# Patient Record
Sex: FEMALE | Race: WHITE | URBAN - METROPOLITAN AREA
[De-identification: names, ages, dates, MRNs, and addresses within clinical notes are randomized per-mention and may not be internally consistent; named-entity substitution may affect disease eponyms.]

---

## 2017-01-01 ENCOUNTER — MEDICAL CORRESPONDENCE (OUTPATIENT)
Dept: HEALTH INFORMATION MANAGEMENT | Facility: CLINIC | Age: 82
End: 2017-01-01

## 2017-01-01 ENCOUNTER — TELEPHONE (OUTPATIENT)
Dept: FAMILY MEDICINE | Facility: CLINIC | Age: 82
End: 2017-01-01

## 2017-01-01 ENCOUNTER — OFFICE VISIT (OUTPATIENT)
Dept: ORTHOPEDICS | Facility: CLINIC | Age: 82
End: 2017-01-01
Payer: COMMERCIAL

## 2017-01-01 ENCOUNTER — TELEPHONE (OUTPATIENT)
Dept: CARE COORDINATION | Facility: CLINIC | Age: 82
End: 2017-01-01

## 2017-01-01 ENCOUNTER — CARE COORDINATION (OUTPATIENT)
Dept: CARE COORDINATION | Facility: CLINIC | Age: 82
End: 2017-01-01

## 2017-01-01 ENCOUNTER — OFFICE VISIT (OUTPATIENT)
Dept: CARDIOLOGY | Facility: CLINIC | Age: 82
End: 2017-01-01
Payer: COMMERCIAL

## 2017-01-01 ENCOUNTER — TELEPHONE (OUTPATIENT)
Dept: ORTHOPEDICS | Facility: CLINIC | Age: 82
End: 2017-01-01

## 2017-01-01 ENCOUNTER — OFFICE VISIT (OUTPATIENT)
Dept: FAMILY MEDICINE | Facility: CLINIC | Age: 82
End: 2017-01-01
Payer: COMMERCIAL

## 2017-01-01 ENCOUNTER — RADIANT APPOINTMENT (OUTPATIENT)
Dept: GENERAL RADIOLOGY | Facility: CLINIC | Age: 82
End: 2017-01-01
Attending: ORTHOPAEDIC SURGERY
Payer: COMMERCIAL

## 2017-01-01 ENCOUNTER — TRANSFERRED RECORDS (OUTPATIENT)
Dept: HEALTH INFORMATION MANAGEMENT | Facility: CLINIC | Age: 82
End: 2017-01-01

## 2017-01-01 ENCOUNTER — RADIANT APPOINTMENT (OUTPATIENT)
Dept: CARDIOLOGY | Facility: CLINIC | Age: 82
End: 2017-01-01
Attending: INTERNAL MEDICINE
Payer: COMMERCIAL

## 2017-01-01 ENCOUNTER — PRE VISIT (OUTPATIENT)
Dept: CARDIOLOGY | Facility: CLINIC | Age: 82
End: 2017-01-01

## 2017-01-01 ENCOUNTER — DOCUMENTATION ONLY (OUTPATIENT)
Dept: CARDIOLOGY | Facility: CLINIC | Age: 82
End: 2017-01-01

## 2017-01-01 VITALS — SYSTOLIC BLOOD PRESSURE: 158 MMHG | DIASTOLIC BLOOD PRESSURE: 83 MMHG | OXYGEN SATURATION: 96 % | HEART RATE: 78 BPM

## 2017-01-01 VITALS
HEART RATE: 96 BPM | WEIGHT: 104 LBS | DIASTOLIC BLOOD PRESSURE: 78 MMHG | HEIGHT: 65 IN | BODY MASS INDEX: 17.33 KG/M2 | OXYGEN SATURATION: 97 % | SYSTOLIC BLOOD PRESSURE: 128 MMHG | TEMPERATURE: 98.2 F

## 2017-01-01 VITALS
RESPIRATION RATE: 18 BRPM | OXYGEN SATURATION: 90 % | SYSTOLIC BLOOD PRESSURE: 149 MMHG | HEART RATE: 102 BPM | DIASTOLIC BLOOD PRESSURE: 88 MMHG

## 2017-01-01 VITALS
DIASTOLIC BLOOD PRESSURE: 80 MMHG | SYSTOLIC BLOOD PRESSURE: 134 MMHG | WEIGHT: 108 LBS | HEIGHT: 65 IN | OXYGEN SATURATION: 97 % | BODY MASS INDEX: 17.99 KG/M2 | TEMPERATURE: 97.4 F | HEART RATE: 80 BPM

## 2017-01-01 VITALS
SYSTOLIC BLOOD PRESSURE: 131 MMHG | WEIGHT: 88.2 LBS | DIASTOLIC BLOOD PRESSURE: 81 MMHG | HEIGHT: 65 IN | OXYGEN SATURATION: 90 % | HEART RATE: 90 BPM | BODY MASS INDEX: 14.7 KG/M2

## 2017-01-01 VITALS
WEIGHT: 102.2 LBS | BODY MASS INDEX: 17.03 KG/M2 | HEART RATE: 79 BPM | HEIGHT: 65 IN | TEMPERATURE: 97.6 F | OXYGEN SATURATION: 95 % | DIASTOLIC BLOOD PRESSURE: 80 MMHG | SYSTOLIC BLOOD PRESSURE: 130 MMHG

## 2017-01-01 VITALS
HEIGHT: 65 IN | WEIGHT: 96 LBS | OXYGEN SATURATION: 98 % | HEART RATE: 78 BPM | DIASTOLIC BLOOD PRESSURE: 64 MMHG | SYSTOLIC BLOOD PRESSURE: 122 MMHG | TEMPERATURE: 97 F | BODY MASS INDEX: 15.99 KG/M2

## 2017-01-01 VITALS
WEIGHT: 95 LBS | HEART RATE: 75 BPM | BODY MASS INDEX: 15.83 KG/M2 | SYSTOLIC BLOOD PRESSURE: 104 MMHG | DIASTOLIC BLOOD PRESSURE: 67 MMHG | RESPIRATION RATE: 14 BRPM | HEIGHT: 65 IN

## 2017-01-01 VITALS
OXYGEN SATURATION: 96 % | RESPIRATION RATE: 16 BRPM | HEART RATE: 102 BPM | SYSTOLIC BLOOD PRESSURE: 133 MMHG | DIASTOLIC BLOOD PRESSURE: 75 MMHG

## 2017-01-01 VITALS
HEART RATE: 88 BPM | RESPIRATION RATE: 16 BRPM | BODY MASS INDEX: 16.24 KG/M2 | WEIGHT: 97.6 LBS | DIASTOLIC BLOOD PRESSURE: 60 MMHG | SYSTOLIC BLOOD PRESSURE: 120 MMHG

## 2017-01-01 DIAGNOSIS — Z96.649 PERIPROSTHETIC FRACTURE OF SHAFT OF FEMUR: ICD-10-CM

## 2017-01-01 DIAGNOSIS — R79.89 ELEVATED TSH: Primary | ICD-10-CM

## 2017-01-01 DIAGNOSIS — M97.8XXA PERIPROSTHETIC FRACTURE OF SHAFT OF FEMUR: ICD-10-CM

## 2017-01-01 DIAGNOSIS — I49.5 SICK SINUS SYNDROME (H): Primary | ICD-10-CM

## 2017-01-01 DIAGNOSIS — I10 BENIGN ESSENTIAL HYPERTENSION: ICD-10-CM

## 2017-01-01 DIAGNOSIS — E78.5 HYPERLIPIDEMIA LDL GOAL <130: ICD-10-CM

## 2017-01-01 DIAGNOSIS — M19.91 PRIMARY LOCALIZED OSTEOARTHROSIS: Primary | ICD-10-CM

## 2017-01-01 DIAGNOSIS — M97.12XD PERIPROSTHETIC FRACTURE AROUND INTERNAL PROSTHETIC LEFT KNEE JOINT, SUBSEQUENT ENCOUNTER: Primary | ICD-10-CM

## 2017-01-01 DIAGNOSIS — I63.9 CEREBROVASCULAR ACCIDENT (CVA), UNSPECIFIED MECHANISM (H): Primary | ICD-10-CM

## 2017-01-01 DIAGNOSIS — S72.342D CLOSED DISPLACED SPIRAL FRACTURE OF SHAFT OF LEFT FEMUR WITH ROUTINE HEALING, SUBSEQUENT ENCOUNTER: ICD-10-CM

## 2017-01-01 DIAGNOSIS — Z95.0 CARDIAC PACEMAKER IN SITU: ICD-10-CM

## 2017-01-01 DIAGNOSIS — H91.93 HL (HEARING LOSS), BILATERAL: ICD-10-CM

## 2017-01-01 DIAGNOSIS — I10 BENIGN ESSENTIAL HYPERTENSION: Primary | ICD-10-CM

## 2017-01-01 DIAGNOSIS — I48.0 PAROXYSMAL ATRIAL FIBRILLATION (H): ICD-10-CM

## 2017-01-01 DIAGNOSIS — Z96.649 PERIPROSTHETIC FRACTURE OF SHAFT OF FEMUR: Primary | ICD-10-CM

## 2017-01-01 DIAGNOSIS — R63.4 LOSS OF WEIGHT: ICD-10-CM

## 2017-01-01 DIAGNOSIS — Z47.89 AFTERCARE FOLLOWING SURGERY OF THE MUSCULOSKELETAL SYSTEM: ICD-10-CM

## 2017-01-01 DIAGNOSIS — R29.898 WEAKNESS OF LEFT LOWER EXTREMITY: ICD-10-CM

## 2017-01-01 DIAGNOSIS — I49.5 SICK SINUS SYNDROME (H): ICD-10-CM

## 2017-01-01 DIAGNOSIS — D64.9 ANEMIA, UNSPECIFIED TYPE: ICD-10-CM

## 2017-01-01 DIAGNOSIS — S72.442S: Primary | ICD-10-CM

## 2017-01-01 DIAGNOSIS — M97.8XXA PERIPROSTHETIC FRACTURE OF SHAFT OF FEMUR: Primary | ICD-10-CM

## 2017-01-01 DIAGNOSIS — I48.0 PAROXYSMAL ATRIAL FIBRILLATION (H): Primary | ICD-10-CM

## 2017-01-01 DIAGNOSIS — Z47.89 SURGICAL AFTERCARE, MUSCULOSKELETAL SYSTEM: ICD-10-CM

## 2017-01-01 DIAGNOSIS — Z95.0 CARDIAC PACEMAKER IN SITU: Primary | ICD-10-CM

## 2017-01-01 DIAGNOSIS — M81.0 OSTEOPOROSIS: ICD-10-CM

## 2017-01-01 DIAGNOSIS — M97.02XA: ICD-10-CM

## 2017-01-01 DIAGNOSIS — K59.09 OTHER CONSTIPATION: ICD-10-CM

## 2017-01-01 DIAGNOSIS — S70.12XA THIGH HEMATOMA, LEFT, INITIAL ENCOUNTER: ICD-10-CM

## 2017-01-01 DIAGNOSIS — I48.91 ATRIAL FIBRILLATION, UNSPECIFIED TYPE (H): ICD-10-CM

## 2017-01-01 DIAGNOSIS — Z53.9 DIAGNOSIS NOT YET DEFINED: Primary | ICD-10-CM

## 2017-01-01 DIAGNOSIS — I63.9 CVA (CEREBRAL VASCULAR ACCIDENT) (H): ICD-10-CM

## 2017-01-01 LAB
ALBUMIN SERPL-MCNC: 2.6 G/DL (ref 3.4–5)
ALP SERPL-CCNC: 154 U/L (ref 40–150)
ALT SERPL W P-5'-P-CCNC: 43 U/L (ref 0–50)
ANION GAP SERPL CALCULATED.3IONS-SCNC: 8 MMOL/L (ref 3–14)
AST SERPL W P-5'-P-CCNC: 51 U/L (ref 0–45)
BASOPHILS # BLD AUTO: 0.1 10E9/L (ref 0–0.2)
BASOPHILS NFR BLD AUTO: 0.5 %
BILIRUB SERPL-MCNC: 0.8 MG/DL (ref 0.2–1.3)
BUN SERPL-MCNC: 9 MG/DL (ref 7–30)
CALCIUM SERPL-MCNC: 8.4 MG/DL (ref 8.5–10.1)
CHLORIDE SERPL-SCNC: 103 MMOL/L (ref 94–109)
CO2 SERPL-SCNC: 28 MMOL/L (ref 20–32)
CREAT SERPL-MCNC: 0.55 MG/DL (ref 0.52–1.04)
DIFFERENTIAL METHOD BLD: ABNORMAL
EOSINOPHIL # BLD AUTO: 0 10E9/L (ref 0–0.7)
EOSINOPHIL NFR BLD AUTO: 0.4 %
ERYTHROCYTE [DISTWIDTH] IN BLOOD BY AUTOMATED COUNT: 16.5 % (ref 10–15)
GFR SERPL CREATININE-BSD FRML MDRD: ABNORMAL ML/MIN/1.7M2
GLUCOSE SERPL-MCNC: 133 MG/DL (ref 70–99)
HCT VFR BLD AUTO: 37.6 % (ref 35–47)
HGB BLD-MCNC: 11.8 G/DL (ref 11.7–15.7)
LYMPHOCYTES # BLD AUTO: 0.4 10E9/L (ref 0.8–5.3)
LYMPHOCYTES NFR BLD AUTO: 3.7 %
MCH RBC QN AUTO: 31.1 PG (ref 26.5–33)
MCHC RBC AUTO-ENTMCNC: 31.4 G/DL (ref 31.5–36.5)
MCV RBC AUTO: 99 FL (ref 78–100)
MONOCYTES # BLD AUTO: 1.1 10E9/L (ref 0–1.3)
MONOCYTES NFR BLD AUTO: 10.3 %
NEUTROPHILS # BLD AUTO: 8.6 10E9/L (ref 1.6–8.3)
NEUTROPHILS NFR BLD AUTO: 85.1 %
PLATELET # BLD AUTO: 381 10E9/L (ref 150–450)
POTASSIUM SERPL-SCNC: 3.4 MMOL/L (ref 3.4–5.3)
PROT SERPL-MCNC: 5.8 G/DL (ref 6.8–8.8)
RBC # BLD AUTO: 3.79 10E12/L (ref 3.8–5.2)
SODIUM SERPL-SCNC: 139 MMOL/L (ref 133–144)
T4 FREE SERPL-MCNC: 0.99 NG/DL (ref 0.76–1.46)
TSH SERPL DL<=0.005 MIU/L-ACNC: 4.65 MU/L (ref 0.4–4)
WBC # BLD AUTO: 10.2 10E9/L (ref 4–11)

## 2017-01-01 PROCEDURE — 80053 COMPREHEN METABOLIC PANEL: CPT | Performed by: FAMILY MEDICINE

## 2017-01-01 PROCEDURE — 84439 ASSAY OF FREE THYROXINE: CPT | Performed by: FAMILY MEDICINE

## 2017-01-01 PROCEDURE — 99024 POSTOP FOLLOW-UP VISIT: CPT | Performed by: ORTHOPAEDIC SURGERY

## 2017-01-01 PROCEDURE — 73552 X-RAY EXAM OF FEMUR 2/>: CPT | Mod: LT

## 2017-01-01 PROCEDURE — 36415 COLL VENOUS BLD VENIPUNCTURE: CPT | Performed by: FAMILY MEDICINE

## 2017-01-01 PROCEDURE — 93280 PM DEVICE PROGR EVAL DUAL: CPT | Performed by: INTERNAL MEDICINE

## 2017-01-01 PROCEDURE — 85025 COMPLETE CBC W/AUTO DIFF WBC: CPT | Performed by: FAMILY MEDICINE

## 2017-01-01 PROCEDURE — 99213 OFFICE O/P EST LOW 20 MIN: CPT | Performed by: ORTHOPAEDIC SURGERY

## 2017-01-01 PROCEDURE — 99213 OFFICE O/P EST LOW 20 MIN: CPT | Performed by: FAMILY MEDICINE

## 2017-01-01 PROCEDURE — 99214 OFFICE O/P EST MOD 30 MIN: CPT | Performed by: FAMILY MEDICINE

## 2017-01-01 PROCEDURE — G0180 MD CERTIFICATION HHA PATIENT: HCPCS | Performed by: FAMILY MEDICINE

## 2017-01-01 PROCEDURE — 93306 TTE W/DOPPLER COMPLETE: CPT | Mod: GC | Performed by: INTERNAL MEDICINE

## 2017-01-01 PROCEDURE — 99204 OFFICE O/P NEW MOD 45 MIN: CPT | Performed by: INTERNAL MEDICINE

## 2017-01-01 PROCEDURE — 40000264 ZZHC STATISTIC IV PUSH SINGLE INITIAL SUBSTANCE: Mod: GC | Performed by: INTERNAL MEDICINE

## 2017-01-01 PROCEDURE — 84443 ASSAY THYROID STIM HORMONE: CPT | Performed by: FAMILY MEDICINE

## 2017-01-01 RX ORDER — ASPIRIN 81 MG/1
81 TABLET, CHEWABLE ORAL DAILY
COMMUNITY

## 2017-01-01 RX ORDER — AMIODARONE HYDROCHLORIDE 200 MG/1
200 TABLET ORAL DAILY
Qty: 90 TABLET | Refills: 1 | Status: SHIPPED | OUTPATIENT
Start: 2017-01-01

## 2017-01-01 RX ORDER — LISINOPRIL 5 MG/1
5 TABLET ORAL DAILY
Qty: 30 TABLET | Refills: 1 | COMMUNITY
Start: 2017-01-01 | End: 2017-01-01

## 2017-01-01 RX ORDER — LISINOPRIL 5 MG/1
5 TABLET ORAL DAILY
Qty: 90 TABLET | Refills: 1 | Status: SHIPPED | OUTPATIENT
Start: 2017-01-01 | End: 2017-01-01 | Stop reason: DRUGHIGH

## 2017-01-01 RX ORDER — DRONABINOL 2.5 MG/1
2.5 CAPSULE ORAL
COMMUNITY
Start: 2017-01-01 | End: 2017-01-01

## 2017-01-01 RX ORDER — ATENOLOL 25 MG/1
25 TABLET ORAL 2 TIMES DAILY
Qty: 180 TABLET | Refills: 4 | Status: SHIPPED | OUTPATIENT
Start: 2017-01-01 | End: 2017-01-01

## 2017-01-01 RX ORDER — TRAMADOL HYDROCHLORIDE 50 MG/1
50 TABLET ORAL
COMMUNITY
Start: 2017-01-01

## 2017-01-01 RX ORDER — LEVETIRACETAM 500 MG/1
500 TABLET ORAL 2 TIMES DAILY
Qty: 60 TABLET | Refills: 0 | COMMUNITY
Start: 2017-01-01 | End: 2017-01-01

## 2017-01-01 RX ORDER — ALENDRONATE SODIUM 70 MG/1
70 TABLET ORAL
Qty: 12 TABLET | Refills: 3 | Status: SHIPPED | OUTPATIENT
Start: 2017-01-01

## 2017-01-01 RX ORDER — AMIODARONE HYDROCHLORIDE 200 MG/1
200 TABLET ORAL DAILY
Qty: 30 TABLET | Refills: 1 | COMMUNITY
Start: 2017-01-01 | End: 2017-01-01

## 2017-01-01 RX ORDER — FERROUS SULFATE 325(65) MG
325 TABLET ORAL
COMMUNITY

## 2017-01-01 RX ORDER — SENNOSIDES 8.6 MG
1 TABLET ORAL DAILY
COMMUNITY

## 2017-01-01 RX ORDER — LISINOPRIL 10 MG/1
10 TABLET ORAL DAILY
Qty: 90 TABLET | Refills: 3 | Status: SHIPPED | OUTPATIENT
Start: 2017-01-01

## 2017-01-01 RX ORDER — LEVETIRACETAM 500 MG/1
500 TABLET ORAL 2 TIMES DAILY
Qty: 180 TABLET | Refills: 3 | Status: SHIPPED | OUTPATIENT
Start: 2017-01-01

## 2017-01-01 RX ORDER — SIMVASTATIN 20 MG
20 TABLET ORAL AT BEDTIME
Qty: 90 TABLET | Refills: 3 | Status: SHIPPED | OUTPATIENT
Start: 2017-01-01

## 2017-01-01 ASSESSMENT — PAIN SCALES - GENERAL
PAINLEVEL: NO PAIN (0)
PAINLEVEL: MILD PAIN (2)
PAINLEVEL: NO PAIN (0)
PAINLEVEL: SEVERE PAIN (6)

## 2017-01-09 NOTE — NURSING NOTE
"Chief Complaint   Patient presents with     RECHECK     Lab results       Initial /80 mmHg  Pulse 80  Temp(Src) 97.4  F (36.3  C)  Ht 5' 5\" (1.651 m)  Wt 108 lb (48.988 kg)  BMI 17.97 kg/m2  SpO2 97% Estimated body mass index is 17.97 kg/(m^2) as calculated from the following:    Height as of this encounter: 5' 5\" (1.651 m).    Weight as of this encounter: 108 lb (48.988 kg).  BP completed using cuff size: bahman Crystal MA      "

## 2017-01-09 NOTE — PATIENT INSTRUCTIONS
[facility]    If you have any questions regarding to your visit please contact your care team:       Team Purple:   Clinic Hours Telephone Number   [address]   7am-7pm  Monday - Thursday   7am-5pm  Fridays  [phone]  (Appointment scheduling available 24/7)    Questions about your Visit?   Team Line:  [phone]   Urgent Care - [place] and [facility] - 11am-9pm Monday-Friday Saturday-Sunday- 9am-5pm   [place] - 5pm-9pm Monday-Friday Saturday-Sunday- 9am-5pm  [phone] - [facility]  [phone] - [place]       What options do I have for visits at the clinic other than the traditional office visit?  To expand how we care for you, many of our providers are utilizing electronic visits (e-visits) and telephone visits, when medically appropriate, for interactions with their patients rather than a visit in the clinic.   We also offer nurse visits for many medical concerns. Just like any other service, we will bill your insurance company for this type of visit based on time spent on the phone with your provider. Not all insurance companies cover these visits. Please check with your medical insurance if this type of visit is covered. You will be responsible for any charges that are not paid by your insurance.      E-visits via [employer]:  generally incur a $35.00 fee.  Telephone visits:  Time spent on the phone: *charged based on time that is spent on the phone in increments of 10 minutes. Estimated cost:   5-10 mins $30.00   11-20 mins. $59.00   21-30 mins. $85.00     Use [employer] (secure email communication and access to your chart) to send your primary care provider a message or make an appointment. Ask someone on your Team how to sign up for [employer].  For a Price Quote for your services, please call our Consumer Price Line at [phone].  As always, Thank you for trusting us with your health care needs!

## 2017-01-09 NOTE — Clinical Note
[facility]  [address] [place] [address]    January 10, 2017    Navid Estrella  [address] [place] [id number]          Dear Navid,  Your thyroid tests are very close to normal now. No need to repeat them and no need for any medications. Continue your healthy lifestyle.  Enclosed is a copy of your results.     Results for orders placed or performed in visit on 01/09/17   TSH with free T4 reflex   Result Value Ref Range    TSH 4.65 (H) 0.40 - 4.00 mU/L   T4 free   Result Value Ref Range    T4 Free 0.99 0.76 - 1.46 ng/dL       If you have any questions or concerns, please call myself or my nurse at [phone].      Sincerely,        Bess Leiva MD/lore

## 2017-01-09 NOTE — MR AVS SNAPSHOT
After Visit Summary   1/9/2017    Navid Estrella    MRN: [id number]           Patient Information     Date Of Birth          5/14/1930        Visit Information        Provider Department      1/9/2017 11:30 AM Bess Leiva MD [facility]        Today's Diagnoses     Elevated TSH    -  1       Care Instructions    [facility]    If you have any questions regarding to your visit please contact your care team:       Team Purple:   Clinic Hours Telephone Number   [address] Dr[address]   7am-7pm  Monday - Thursday   7am-5pm  Fridays  [phone]  (Appointment scheduling available 24/7)    Questions about your Visit?   Team Line:  [phone]   Urgent Care - [place] and [facility] - 11am-9pm Monday-Friday Saturday-Sunday- 9am-5pm   [place] - 5pm-9pm Monday-Friday Saturday-Sunday- 9am-5pm  [phone] - [place]   [phone] - [place]       What options do I have for visits at the clinic other than the traditional office visit?  To expand how we care for you, many of our providers are utilizing electronic visits (e-visits) and telephone visits, when medically appropriate, for interactions with their patients rather than a visit in the clinic.   We also offer nurse visits for many medical concerns. Just like any other service, we will bill your insurance company for this type of visit based on time spent on the phone with your provider. Not all insurance companies cover these visits. Please check with your medical insurance if this type of visit is covered. You will be responsible for any charges that are not paid by your insurance.      E-visits via [employer]:  generally incur a $35.00 fee.  Telephone visits:  Time spent on the phone: *charged based on time that is spent on the phone in increments of 10 minutes. Estimated cost:   5-10 mins $30.00   11-20 mins. $59.00   21-30 mins. $85.00     Use  "MyChart (secure email communication and access to your chart) to send your primary care provider a message or make an appointment. Ask someone on your Team how to sign up for [employer].  For a Price Quote for your services, please call our [employer] Price Line at [phone].  As always, Thank you for trusting us with your health care needs!            Follow-ups after your visit        Who to contact     If you have questions or need follow up information about today's clinic visit or your schedule please contact [facility] directly at [phone].  Normal or non-critical lab and imaging results will be communicated to you by MyChart, letter or phone within 4 business days after the clinic has received the results. If you do not hear from us within 7 days, please contact the clinic through [employer] or phone. If you have a critical or abnormal lab result, we will notify you by phone as soon as possible.  Submit refill requests through [employer] or call your pharmacy and they will forward the refill request to us. Please allow 3 business days for your refill to be completed.          Additional Information About Your Visit        MyChart Information     [employer] gives you secure access to your electronic health record. If you see a primary care provider, you can also send messages to your care team and make appointments. If you have questions, please call your primary care clinic.  If you do not have a primary care provider, please call [phone] and they will assist you.        Care EveryWhere ID     This is your Care EveryWhere ID. This could be used by other organizations to access your [facility] medical records  [id number]        Your Vitals Were     Pulse Temperature Height BMI (Body Mass Index) Pulse Oximetry       80 97.4  F (36.3  C) 5' 5\" (1.651 m) 17.97 kg/m2 97%        Blood Pressure from Last 3 Encounters:   01/09/17 134/80   09/20/16 128/70   05/24/16 158/83    Weight from Last 3 Encounters: "   01/09/17 108 lb (48.988 kg)   09/20/16 107 lb 9.6 oz (48.807 kg)   05/24/16 109 lb (49.442 kg)              We Performed the Following     TSH with free T4 reflex        Primary Care Provider Office Phone # Fax #    Bess Leiva [phone]       [address] [address]        Thank you!     Thank you for choosing [facility]  for your care. Our goal is always to provide you with excellent care. Hearing back from our patients is one way we can continue to improve our services. Please take a few minutes to complete the written survey that you may receive in the mail after your visit with us. Thank you!             Your Updated Medication List - Protect others around you: Learn how to safely use, store and throw away your medicines at www.disposemymeds.org.          This list is accurate as of: 1/9/17 11:59 AM.  Always use your most recent med list.                   Brand Name Dispense Instructions for use    alendronate 70 MG tablet    FOSAMAX    12 tablet    Take 1 tablet (70 mg) by mouth every 7 days Take with over 8 ounces water and stay upright for at least 30 minutes after dose.  Take at least 60 minutes before breakfast       aspirin 81 MG tablet      Take 1 tablet by mouth daily.       atenolol 25 MG tablet    TENORMIN    180 tablet    TAKE ONE TABLET BY MOUTH TWICE A DAY       fluticasone 50 MCG/ACT spray    FLONASE    16 g    Spray 1-2 sprays into both nostrils daily       OXYCODONE HCL PO      Take 5 mg by mouth every 3 hours as needed       polyethylene glycol powder    MIRALAX    510 g    Take 17 g by mouth daily       simvastatin 40 MG tablet    ZOCOR    90 tablet    TAKE ONE TABLET BY MOUTH EVERY DAY. DUE FOR FASTING LABS.       TYLENOL PO      Take 500 mg by mouth every 4 hours as needed for mild pain or fever       vitamin D 1000 UNITS capsule      1 CAPSULE DAILY

## 2017-01-09 NOTE — PROGRESS NOTES
"Chief Complaint   Patient presents with     RECHECK     Lab results     SUBJECTIVE:  This 86 year old female is here today because her TSH was elevated to 6.62 but her T4 was normal. She is here to have it rechecked. She doesn't have sleeping or eating problems. She will fly to [place] next month to spend some time with her daughter and 2 grandchildren. She only has one child. She has a sister and nieces that live here in [place] that help her a lot. She has no new concerns. She is happy to tell me that she has gained 2 pounds. Doesn't know how she did it. All other review of systems are negative  Personal, family, and social history reviewed with patient and revised.  Current Outpatient Prescriptions   Medication     simvastatin (ZOCOR) 40 MG tablet     atenolol (TENORMIN) 25 MG tablet     alendronate (FOSAMAX) 70 MG tablet     Acetaminophen (TYLENOL PO)     polyethylene glycol (MIRALAX) powder     aspirin 81 MG tablet     VITAMIN D 1000 UNIT OR CAPS     OXYCODONE HCL PO     fluticasone (FLONASE) 50 MCG/ACT nasal spray     No current facility-administered medications for this visit.     OBJECTIVE:  Vital signs:  Temp: 97.4  F (36.3  C)   BP: 134/80 mmHg Pulse: 80     SpO2: 97 %     Height: 5' 5\" (165.1 cm) Weight: 108 lb (48.988 kg)  Estimated body mass index is 17.97 kg/(m^2) as calculated from the following:    Height as of this encounter: 5' 5\" (1.651 m).    Weight as of this encounter: 108 lb (48.988 kg).    She is very slim. I do not want to put her on synthroid unless I have to. She is not tachycardic.   Heart: normal rate and rhythm with no murmur  Lungs; clear in all fields  Anxious lady with rapid speech. This is normal for her  Well hydrated  Well nourished  Well groomed  Alert and oriented X 3  Good spirits  Brisk gait.  ASSESSMENT / PLAN:  (R94.6) Elevated TSH  (primary encounter diagnosis)  Comment: as above   Plan: TSH with free T4 reflex             TATE WILKS M.D.          "

## 2017-02-14 NOTE — TELEPHONE ENCOUNTER
Reason for Call:  Other FYI    Detailed comments: patient daughter calling and stating that patient went down to [place] to visit and had a stroke. Patient is currently in the ICU and she has lost her speech.    Phone Number Patient can be reached at: Work number on file: [phone]  Best Time: any time    Can we leave a detailed message on this number? YES    Call taken on 2/14/2017 at 2:43 PM by Maren Bruno

## 2017-02-20 NOTE — TELEPHONE ENCOUNTER
Reason for Call:  Other     Detailed comments: Kathya reports that patient is currently discharged from the hospital and she needs a call back to discuss new medications and care.    Phone Number Patient can be reached at: Cell number on file:    Telephone Information:(Cell)   [phone]        Best Time: anytime    Can we leave a detailed message on this number? YES    Call taken on 2/20/2017 at 4:22 PM by Amy Beltran

## 2017-02-21 NOTE — TELEPHONE ENCOUNTER
Left message on voicemail for Kathya to return call to RN hotline at # [phone].  Stephen Stanford RN

## 2017-02-24 NOTE — TELEPHONE ENCOUNTER
Voice mail left for patients daughter to call RN hotline at [phone].    Ladonna Gallegos RN

## 2017-03-28 PROBLEM — I63.9 CEREBROVASCULAR ACCIDENT (CVA), UNSPECIFIED MECHANISM (H): Status: ACTIVE | Noted: 2017-01-01

## 2017-03-28 NOTE — TELEPHONE ENCOUNTER
Spoke with Niece, gave information regarding Care coordinator referral.  She states daughter will want to call.  Gave daughter number [phone] to call Care Coordination.  Ladonna Gallegos RN

## 2017-03-28 NOTE — TELEPHONE ENCOUNTER
Call Maren, I will place order for care coordination. Maren or her daughter need to make sure patient has good support systems at this end before they go back to [place] as patient is very hard of hearing. She needs help to set up a life alert system, probably meals on wheels, etc.     TATE WILKS M.D.

## 2017-03-28 NOTE — TELEPHONE ENCOUNTER
Spoke with Maren (niece), patient was in [place] and had a stroke, patient is coming back Monday she is wondering if there is a  or Care coordinator that can help patient with any services or help she may need.   Please advise  Ladonna Gallegos RN

## 2017-03-28 NOTE — TELEPHONE ENCOUNTER
Reason for Call:  Other call back    Detailed comments: Maren would like a return call to discuss the status of the patient, and also to discuss the next steps as the patient is currently located in [place] and wanting to return back to [place],  Maren has concerns as the patient had a stroke in the past and also forgetful at times please call to discuss further    Phone Number Patient can be reached at: Other phone number:  [phone]    Best Time: today    Can we leave a detailed message on this number? YES    Call taken on 3/28/2017 at 10:40 AM by Corby Murphy

## 2017-03-29 NOTE — LETTER
[facility]   [address] [place] [address]  [place] [address]  [phone]    March 29, 2017    Navid Estrella  [address] [place] [address]  [place] [address]    Dear Navid,  I am the Clinic Care Coordinator that works with your primary care provider's clinic. I wanted to introduce myself and provide you with my contact information for you to be able to call me with any questions or concerns. Below is a description of what Clinic Care Coordination is and how I can further assist you.     The Clinic Care Coordinator role is a Registered Nurse and/or  who understands the health care system. The goal of Clinic Care Coordination is to help you manage your health and improve access to the [facility] in the most efficient manner.  The Registered Nurse can assist you in meeting your health care goals by providing education, coordinating services, and strengthening the communication among your providers. The  can assist you with financial, behavioral, psychosocial, and chemical dependency and counseling/psychiatric resources.    Please feel free to keep this letter and contact information to contact me at [phone] with any further questions or concerns that may arise. We at [facility] are focused on providing you with the highest-quality healthcare experience possible and that all starts with you.       Sincerely,     Nataly Murcia RN BSN, PHN RN Care Coordinator  [facility]  [email or web address]  [phone]  3/29/2017 3:12 PM  Enclosed: I have enclosed a copy of a 24 Hour Access Plan. This has helpful phone numbers for you to call when needed. Please keep this in an easy to access place to use as needed.

## 2017-03-29 NOTE — TELEPHONE ENCOUNTER
Reason for Call:  Other Referral needed for cardiology.    Detailed comments: Daughter is calling and would like a call back to discuss getting a referral to see the cardiologist for her mother.     Phone Number Patient can be reached at: Other phone number: [phone]     Best Time: anytime    Can we leave a detailed message on this number? YES    Call taken on 3/29/2017 at 1:33 PM by Sofia Ross

## 2017-03-29 NOTE — TELEPHONE ENCOUNTER
Voice mail left for patients daughter to call RN hotline at [phone].    Ladonna Gallegos RN

## 2017-03-29 NOTE — PROGRESS NOTES
Clinic Care Coordination Contact  [facility]/Voicemail    Referral Source: PCP    Clinical Data: Care Coordinator Outreach - Per provider note and Purple team RN note pt daughter called requesting assistance in arranging services for pt.  Pt currently in [place] visiting her daughter is unfortunately suffered a stroke.   Per notes pt will be returning to [place] on Monday 4/3/17.  Pt has an appt with Dr. Smith on 4/11/17.  VM left for daughter Isaac, [phone].      Outreach attempted x 1.  Left message on voicemail with call back information and requested return call.    Plan: Care Coordinator will mail out care coordination introduction letter with care coordinator contact information and explanation of care coordination services. Care Coordinator will try to reach patient again in 1-2 business days.    Nataly Murcia RN BSN, PHN RN Care Coordinator  [facility]  [email or web address]  [phone]  3/29/2017 3:09 PM

## 2017-03-29 NOTE — LETTER
My Access Plan    Presenting Problem Signs and Symptoms Treatment Plan    Questions or concerns during clinic hours    I will call the clinic directly:                     [facility]    [address]               [place] [address]                  [phone]       Questions or concerns outside clinic hours    I will call the 24 hour nurse line at [phone] or [phone]    Patient needs to schedule an appointment    I will call the 24 hour scheduling team at [phone] or clinic directly at [phone]    Same day treatment     I will call the clinic first, nurse line if after hours, urgent care and express care if needed   Clinic Care Coordinators (RN/Social Work):            [facility]      Nataly Murcia RN BSN N  [phone]    SIERRA Yeung W [phone]   Crisis Services: Behavioral or Mental Health    [facility] (Behavioral Health Providers) [phone]    [facility] [phone]    [facility] [phone]    Crisis Connection (24/7): [phone]       Emergency treatment--Immediately    CAll [address]

## 2017-03-29 NOTE — PROGRESS NOTES
Clinic Care Coordination Contact  OUTREACH    Referral Information:  Referral Source: PCP  Reason for Contact: new stroke/resources  Care Conference: No     Universal Utilization:   ED Visits in last year: 1  Hospital visits in last year: 1  Last PCP appointment: 09/20/16  Missed Appointments: 0     Clinical Concerns:  Current Medical Concerns: In January, pt was visiting her daughter in [place] when she suffered a stroke.  Pt has been living with her daughter but will be returning to [place] on Monday 4/3/17.    Pt had home care services - PT, OT and SLP and will need on going PT and SLP when she returns to [place].  Pt is able to dress herself and is ambulating short distances without an assistive device.   Pt does have a speech impairment.  Prior to her stroke pt lived alone in her own home.  Pt daughter is looking for services to assist with groceries, laundry, housekeeping and transportation.   Home Care services have cleared her to pursue outpatient PT and SLP.    Discussed private pay vs waivered service program.  Unclear if pt is able to afford private pay services.  Agreed to email pt daughter at [email or web address] with resource information.     Pt sister will be checking on patient daily - at least initially.  Pt has two nieces Maren and Arabella who live locally and are also able to provide some assistance.  Pt daughter will check with Neurologist that was working with pt about f/u in [place].   Pt will need cardiology f/u.   Will email pt daughter release of information forms for providers in [place] to daughter as well.     Pt has f/u with Dr. Leiva on 4/11/17.  Current Behavioral Concerns: None   Education Provided to patient: CC role, community resources, f/u plan of care.  Clinical Pathway Name: None    Medication Management:  Reviewed medication list with pt daughter.  Pt has daughter is currently setting up medications weekly for patient.  Pt sister will assume this task when pt returns to [place].  Pt was  started on the following new medications:  Eliquis 2.5 mg po BID  Levetiractem (Keppra) 500 mg po BID  Lisinopril 5 mg po daily  Amiodarone 200 mg 1x daily.     Functional Status:  Mobility Status:  Per pt daughter pt is ambulating independently without any assistive device.   Transportation: Per pt daughter pt was cleared to drive to [facility] only.Pt will need assistance with transportation resources      Psychosocial:  Current living arrangement:: Not Assessed  Financial/Insurance:  Pt daughter notes no concerns with pt insurance.  reviewed that community resources requested - ie homemaker, grocery delivery, transportation would not be covered by pt insurance.     Resources and Interventions:  Current Resources: None      Advanced Care Plans/Directives on file:: Yes        Goals: TBD     Barriers: New stroke with speech deficits.  Strengths: Family involved and supportive.    Patient/Caregiver understanding:   Frequency of Care Coordination: TBD  Upcoming appointment: 04/11/17     Plan:   Pt has f/u appt with Dr Leiva on 4/11/17.  Pt will need cardiology f/u.  Pt daughter will reconfirm need for neurology f/u.  Emailed the following resources/information to patient daughter:    [facility] Traveler  [facility] Delivery  [facility] Grocery   Store to [facility] Waivered services/program  [employer]   [facility].    Agreed to f/u with pt daughter on Friday 3/31.    Nataly Murcia, RN BSN, PHN RN Care Coordinator  [facility]  [email or web address]  [phone]  3/29/2017 4:26 PM

## 2017-03-30 PROBLEM — Z95.0 CARDIAC PACEMAKER IN SITU: Status: ACTIVE | Noted: 2017-01-01

## 2017-03-30 PROBLEM — I48.91 A-FIB (H): Status: ACTIVE | Noted: 2017-01-01

## 2017-03-30 NOTE — TELEPHONE ENCOUNTER
Left message on voicemail for patient's daughter to return call to RN hotline at # [phone].  Stephen Stanford RN

## 2017-03-30 NOTE — TELEPHONE ENCOUNTER
Spoke with daughter, patient was out of state visiting daughter and had a stroke and also had a pacemaker implanted for A-fib.  Daughter would like a referral for a cardiologist so patient can f/u.   Referral teed up please advise   Ladonna Gallegos RN

## 2017-03-31 NOTE — PROGRESS NOTES
Clinic Care Coordination Contact  [facility]/Voicemail    Referral Source: PCP  Clinical Data: Care Coordinator Outreach  Outreach attempted x 1.  Left message on voicemail with call back information and requested return call.  Plan: Care Coordinator will mail out care coordination introduction letter with care coordinator contact information and explanation of care coordination services. Care Coordinator will try to reach patient again in 1-2 business days.      Nataly Murcia, RN BSN, PHN RN Care Coordinator  [facility]  [email or web address]  [phone]  3/31/2017 1:20 PM

## 2017-03-31 NOTE — TELEPHONE ENCOUNTER
Patients daughter notified of providers message as written.  Patients daughter verbalized understanding, no further questions or concerns.     Ladonna Gallegos RN

## 2017-04-04 NOTE — PROGRESS NOTES
SUBJECTIVE:                                                    Navid Estrella is a 86 year old female who presents to clinic today for the following health issues:          Hospital Follow-up Visit:    Hospital/Nursing Home/ Rehab Facility: [facility]  Date of Admission: February, 2017  Date of Discharge: a few days later.  Reason(s) for Admission: stroke              Problems taking medications regularly:  None       Medication changes since discharge: None       Problems adhering to non-medication therapy:  None    Summary of hospitalization:  Discharge summary unavailable  Diagnostic Tests/Treatments reviewed.  Follow up needed: patient needs outpatient physical therapy and speech therapy   Other Healthcare Providers Involved in Patient s Care:         None  Update since discharge: improved.     Post Discharge Medication Reconciliation: discharge medications reconciled, continue medications without change.  Plan of care communicated with patient     Coding guidelines for this visit:  Type of Medical   Decision Making Face-to-Face Visit       within 7 Days of discharge Face-to-Face Visit        within 14 days of discharge   Moderate Complexity [id number]   High Complexity 68236 [id number]                 Problem list and histories reviewed & adjusted, as indicated.  Additional history: as documented    Patient Active Problem List   Diagnosis     OA (osteoarthritis)     HYPERLIPIDEMIA LDL GOAL <130     Status post total knee replacement     Advanced directives, counseling/discussion     HL (hearing loss)     Pseuodophakia OU     Osteoporosis     Keratoacanthoma of cheek     Constipation     Impacted cerumen     History of skin cancer     Actinic keratosis     PVD (posterior vitreous detachment), left     Closed displaced fracture of phalanx of lesser toe of right foot, unspecified phalanx, initial encounter     Benign essential hypertension     Cerebrovascular accident (CVA), unspecified mechanism (H)     Cardiac  pacemaker in situ     A-fib (H)     Sick sinus syndrome (H)     Past Surgical History:   Procedure Laterality Date     APPENDECTOMY       ARTHROSCOPY KNEE RT/LT  1998    right     ARTHROSCOPY KNEE RT/LT  9/03    left     BREAST LUMPECTOMY, RT/LT  9/01    right breast in situ for carcinoma     C IMPLANT COCHLEAR DEVICE  3/2016    right     C TOTAL KNEE ARTHROPLASTY  4/11/11    left     CATARACT IOL, RT/LT  7/08    right     CATARACT IOL, RT/LT  8/08    left     COLONOSCOPY  3/19/2009     ENT SURGERY  5-23-13    Left ear canal biopsy     HC ERCP W  BIOPSY, SINGLE/MULTIPLE  6/10    possible pancreatic mass     HYSTERECTOMY, VAGINAL  3/01     JOINT REPLACEMTN, KNEE RT/LT  2/2010    RT     LAPAROSCOPIC TRANSGASTRIC ENDOSCOPIC RETROGRADE CHOLANGIOPANCREATOGRAPHY (ERCP)  7/11    follow up on possible pancreatic mass     LAPAROSCOPIC TRANSGASTRIC ENDOSCOPIC RETROGRADE CHOLANGIOPANCREATOGRAPHY (ERCP)  7/12    possible pancreatic mass biopsy     ROTATOR CUFF REPAIR RT/LT  1/07    left     TONSILLECTOMY & ADENOIDECTOMY         Social History   Substance Use Topics     Smoking status: Former Smoker     Quit date: 8/1/1991     Smokeless tobacco: Never Used      Comment: quit 20 years ago     Alcohol use 0.0 oz/week     0 Standard drinks or equivalent per week      Comment: Occassionally     Family History   Problem Relation Age of Onset     Hypertension Mother      CEREBROVASCULAR DISEASE Mother      CANCER Father      pancreatic Cancer Age 76     Hypertension Sister      CEREBROVASCULAR DISEASE Sister      HEART DISEASE Sister      Hypertension Sister      LUNG DISEASE Sister          Current Outpatient Prescriptions   Medication Sig Dispense Refill     lisinopril (PRINIVIL/ZESTRIL) 5 MG tablet Take 1 tablet (5 mg) by mouth daily 90 tablet 1     amiodarone (PACERONE/CODARONE) 200 MG tablet Take 1 tablet (200 mg) by mouth daily 90 tablet 1     alendronate (FOSAMAX) 70 MG tablet Take 1 tablet (70 mg) by mouth every 7 days Take  with over 8 ounces water and stay upright for at least 30 minutes after dose.  Take at least 60 minutes before breakfast 12 tablet 3     apixaban ANTICOAGULANT (ELIQUIS) 2.5 MG tablet Take 1 tablet (2.5 mg) by mouth 2 times daily 180 tablet 3     levETIRAcetam (KEPPRA) 500 MG tablet Take 1 tablet (500 mg) by mouth 2 times daily 180 tablet 3     simvastatin (ZOCOR) 20 MG tablet Take 1 tablet (20 mg) by mouth At Bedtime 90 tablet 3     apixaban ANTICOAGULANT (ELIQUIS) 2.5 MG tablet Take 1 tablet (2.5 mg) by mouth 2 times daily       atenolol (TENORMIN) 25 MG tablet TAKE ONE TABLET BY MOUTH TWICE A  tablet 4     polyethylene glycol (MIRALAX) powder Take 17 g by mouth daily 510 g 1     VITAMIN D 1000 UNIT OR CAPS Reported on 4/11/2017       [DISCONTINUED] levETIRAcetam (KEPPRA) 500 MG tablet Take 1 tablet (500 mg) by mouth 2 times daily 60 tablet 0     [DISCONTINUED] lisinopril (PRINIVIL/ZESTRIL) 5 MG tablet Take 1 tablet (5 mg) by mouth daily 30 tablet 1     [DISCONTINUED] amiodarone (PACERONE/CODARONE) 200 MG tablet Take 1 tablet (200 mg) by mouth daily 30 tablet 1     [DISCONTINUED] simvastatin (ZOCOR) 40 MG tablet TAKE ONE TABLET BY MOUTH EVERY DAY. DUE FOR FASTING LABS. (Patient taking differently: 20 mg TAKE ONE TABLET BY MOUTH EVERY DAY. DUE FOR FASTING LABS.) 90 tablet 4     [DISCONTINUED] alendronate (FOSAMAX) 70 MG tablet Take 1 tablet (70 mg) by mouth every 7 days Take with over 8 ounces water and stay upright for at least 30 minutes after dose.  Take at least 60 minutes before breakfast 12 tablet 3     BP Readings from Last 3 Encounters:   04/11/17 128/78   01/09/17 134/80   09/20/16 128/70    Wt Readings from Last 3 Encounters:   04/11/17 104 lb (47.2 kg)   01/09/17 108 lb (49 kg)   09/20/16 107 lb 9.6 oz (48.8 kg)                  Labs reviewed in EPIC    Reviewed and updated as needed this visit by clinical staff       Reviewed and updated as needed this visit by Provider         ROS:  This 86 year  "old female is here today because she had flown to [place] early February to spend the month with her daughter, which she does every winter. Daughter was in a room with patient about 2/14/17 and patient suddenly started to shake and her legs became weak. 911 was called. Daughter called our office on 2/14/17 stating that patient was in ICU with diagnosis of stroke and she was aphasic. Patient was diagnosed with paroxysmal atrial fibrillation and was started on eliquis and amiodarone. Cardioversion did not work. Her speech returned to almost normal within 3 days. She was discharged on keppra as well to prevent seizures. Patient was readmitted to hospital in [place] a few weeks later for pacemaker placement on 3/6/17 as she also had sick sinus syndrome. She has received home physical therapy, OT, and speech therapy at her daughter's home and patient flew home on 4/3/17. She will be seen by a cardiologist this week. Her simvastatin was reduced to 20 mg because she is on amiodarone.  She needs referrals for her therapy and prefers them at [place] as she can still drive and she lives close to [place]. Patient is eating well. She has a sister and some nieces that live close by and help her. Her neighbors are very helpful also. All other review of systems are negative  Personal, family, and social history reviewed with patient and revised.         OBJECTIVE:                                                    /78 (BP Location: Right arm, Patient Position: Chair, Cuff Size: Adult Regular)  Pulse 96  Temp 98.2  F (36.8  C)  Ht 5' 5\" (1.651 m)  Wt 104 lb (47.2 kg)  SpO2 97%  BMI 17.31 kg/m2  Body mass index is 17.31 kg/(m^2).  GENERAL: healthy, alert and no distress  NECK: no adenopathy, no asymmetry, masses, or scars and thyroid normal to palpation  RESP: lungs clear to auscultation - no rales, rhonchi or wheezes  CV: regular rate and rhythm, normal S1 S2, no S3 or S4, no murmur, click or rub, no peripheral edema "   ABDOMEN: soft, nontender, no hepatosplenomegaly, no masses and bowel sounds normal  MS: no gross musculoskeletal defects noted, no edema  Patient is her normal jovial, self confident self. She has no speech deficits here today. Her memory is excellent. She recalls clearly what it felt like to not be able to speak. She recalls being very sleepy the first few days and feeling frustrated that she could not get out of bed.   Her CVA has not left her with any residual deficits that I can see today and I know her quite well.   Diagnostic Test Results:  none      ASSESSMENT/PLAN:                                                             1. Cerebrovascular accident (CVA), unspecified mechanism (H)  As above, recovery has been superior   - levETIRAcetam (KEPPRA) 500 MG tablet; Take 1 tablet (500 mg) by mouth 2 times daily  Dispense: 180 tablet; Refill: 3  - PHYSICAL THERAPY REFERRAL  - SPEECH THERAPY REFERRAL    2. Paroxysmal atrial fibrillation (H)  As above   - amiodarone (PACERONE/CODARONE) 200 MG tablet; Take 1 tablet (200 mg) by mouth daily  Dispense: 90 tablet; Refill: 1  - apixaban ANTICOAGULANT (ELIQUIS) 2.5 MG tablet; Take 1 tablet (2.5 mg) by mouth 2 times daily  Dispense: 180 tablet; Refill: 3    3. Sick sinus syndrome (H)  As above   - amiodarone (PACERONE/CODARONE) 200 MG tablet; Take 1 tablet (200 mg) by mouth daily  Dispense: 90 tablet; Refill: 1    4. Benign essential hypertension  Good control   - lisinopril (PRINIVIL/ZESTRIL) 5 MG tablet; Take 1 tablet (5 mg) by mouth daily  Dispense: 90 tablet; Refill: 1    5. Cardiac pacemaker in situ  As above     6. Osteoporosis  Good control   - alendronate (FOSAMAX) 70 MG tablet; Take 1 tablet (70 mg) by mouth every 7 days Take with over 8 ounces water and stay upright for at least 30 minutes after dose.  Take at least 60 minutes before breakfast  Dispense: 12 tablet; Refill: 3    7. Hyperlipidemia LDL goal <130  Good control   - simvastatin (ZOCOR) 20 MG tablet;  Take 1 tablet (20 mg) by mouth At Bedtime  Dispense: 90 tablet; Refill: 3    Return to clinic as needed.     TATE WILKS MD  [facility]

## 2017-04-06 NOTE — PROGRESS NOTES
Clinic Care Coordination Contact  [facility]/Voicemail    Referral Source: PCP    Clinical Data: Care Coordinator Outreach    Outreach attempted x 2.  Left message on pt daughter, Kathya's voicemail with call back information and requested return call.    Plan: Care Coordinator mailed out care coordination introduction letter on 3/29/17. Care Coordinator will try to reach patient again in 1-2 business days.      Nataly Murcia, RN BSN, PHN RN Care Coordinator  [facility]  [email or web address]  [phone]  4/6/2017 2:05 PM

## 2017-04-11 PROBLEM — I49.5 SICK SINUS SYNDROME (H): Status: ACTIVE | Noted: 2017-01-01

## 2017-04-11 NOTE — TELEPHONE ENCOUNTER
"HPI:  Current Medical Concerns: Pt in clinic today for appt with her PCP. Pt returned from [place] last Monday. pt s/p stroke and new pacemaker. Pt states she is doing very well on her own. Pt has two nieces and a sister that live locally. Pt also states she has friends and neighbors who are also available to assist. Pt does have [employer] in place. Pt is managing all home needs - cooking, cleaning, laundry. Family helping with groceries. Pt sister has assisted pt with mediction set up. Reviewed recommendation for outpatient PT/SLP. REviewed options within  however SLP services would be to far for the pt to drive. Pt would prefer outpatient SLP and PT at [facility] as it is closer to pt home. Pt has an appointment to establish care with cardiology tomorrow. Pt voiced no other concerns or needs at this time. Reviewed with pt \"Authorization to Discuss form\". Pt completed form allowing staff to be able to communicate with her daughter Isaac Yung. Pt is open to having CC f/u with her in 10-14 days. Reviewed above with pt and Dr. Leiva.    ASSESSMENT & PLAN:  Pt will follow plan of care as directed by her PCP.  Pt has an appointment with Dr. Woodard, Cardiologist at  on 17.  Pt will arrange outpatient PT and SLP at [facility].  RN CC to f/u with pt in 10-14 days.    Last EC  I reviewed and I agree. MELISSA Leiva MD  Sinus  Rhythm  - occasional PAC     # PACs = 1.  WITHIN NORMAL LIMITS    Last CARDIAC MONITOR: 2016-2016  Normal Sinus Rhythm with a-fib found.  High HR of 188, low of 59, average of 84.   Symptoms recorded with A-fib.          "

## 2017-04-11 NOTE — PATIENT INSTRUCTIONS
[facility]    If you have any questions regarding to your visit please contact your care team:       Team Purple:   Clinic Hours Telephone Number   [address] Dr[address]   7am-7pm  Monday - Thursday   7am-5pm  Fridays  [phone]  (Appointment scheduling available 24/7)    Questions about your Visit?   Team Line:  [phone]   Urgent Care - [place] and [facility] - 11am-9pm Monday-Friday Saturday-Sunday- 9am-5pm   [place] - 5pm-9pm Monday-Friday Saturday-Sunday- 9am-5pm  [phone] - [facility]  [phone] - [place]       What options do I have for visits at the clinic other than the traditional office visit?  To expand how we care for you, many of our providers are utilizing electronic visits (e-visits) and telephone visits, when medically appropriate, for interactions with their patients rather than a visit in the clinic.   We also offer nurse visits for many medical concerns. Just like any other service, we will bill your insurance company for this type of visit based on time spent on the phone with your provider. Not all insurance companies cover these visits. Please check with your medical insurance if this type of visit is covered. You will be responsible for any charges that are not paid by your insurance.      E-visits via [employer]:  generally incur a $35.00 fee.  Telephone visits:  Time spent on the phone: *charged based on time that is spent on the phone in increments of 10 minutes. Estimated cost:   5-10 mins $30.00   11-20 mins. $59.00   21-30 mins. $85.00     Use [employer] (secure email communication and access to your chart) to send your primary care provider a message or make an appointment. Ask someone on your Team how to sign up for [employer].  For a Price Quote for your services, please call our Consumer Price Line at [phone].  As always, Thank you for trusting us with your health care needs!

## 2017-04-11 NOTE — MR AVS SNAPSHOT
After Visit Summary   4/11/2017    Navid Estrella    MRN: [id number]           Patient Information     Date Of Birth          5/14/1930        Visit Information        Provider Department      4/11/2017 10:00 AM Bess Leiva MD [facility]        Today's Diagnoses     Cerebrovascular accident (CVA), unspecified mechanism (H)    -  1    Paroxysmal atrial fibrillation (H)        Sick sinus syndrome (H)        Benign essential hypertension        Cardiac pacemaker in situ        Osteoporosis        Hyperlipidemia LDL goal <130          Care Instructions    [facility]    If you have any questions regarding to your visit please contact your care team:       Team Purple:   Clinic Hours Telephone Number   [address] Dr.   7am-7pm  Monday - Thursday   7am-5pm  Fridays  [phone]  (Appointment scheduling available 24/7)    Questions about your Visit?   Team Line:  [phone]   Urgent Care - [place] [facility] - 11am-9pm Monday-Friday Saturday-Sunday- 9am-5pm   [place] - 5pm-9pm Monday-Friday Saturday-Sunday- 9am-5pm  [phone] - [place]   [phone] - [place]       What options do I have for visits at the clinic other than the traditional office visit?  To expand how we care for you, many of our providers are utilizing electronic visits (e-visits) and telephone visits, when medically appropriate, for interactions with their patients rather than a visit in the clinic.   We also offer nurse visits for many medical concerns. Just like any other service, we will bill your insurance company for this type of visit based on time spent on the phone with your provider. Not all insurance companies cover these visits. Please check with your medical insurance if this type of visit is covered. You will be responsible for any charges that are not paid by your insurance.      E-visits via  Simracewayhart:  generally incur a $35.00 fee.  Telephone visits:  Time spent on the phone: *charged based on time that is spent on the phone in increments of 10 minutes. Estimated cost:   5-10 mins $30.00   11-20 mins. $59.00   21-30 mins. $85.00     Use Simracewayhart (secure email communication and access to your chart) to send your primary care provider a message or make an appointment. Ask someone on your Team how to sign up for Instamour.  For a Price Quote for your services, please call our HealthSynch Line at [phone].  As always, Thank you for trusting us with your health care needs!            Follow-ups after your visit        Additional Services     PHYSICAL THERAPY REFERRAL       *This therapy referral will be filtered to a centralized scheduling office at [facility] and the patient will receive a call to schedule an appointment at a [place] location most convenient for them.   Patient needs out patient physical therapy at [place] as she will drive herself there and she lives close to [facility] provides Physical Therapy evaluation and treatment and many specialty services across the [place] system.  If requesting a specialty program, please choose from the list below.    If you have not heard from the scheduling office within 2 business days, please call [phone] for all locations, with the exception of [place], please call [phone].  Treatment: Evaluation & Treatment  Special Instructions/Modalities:    Special Programs: post CVA    Please be aware that coverage of these services is subject to the terms and limitations of your health insurance plan.  Call member services at your health plan with any benefit or coverage questions.      **Note to Provider:  If you are referring outside of [place] for the therapy appointment, please list the name of the location in the  special instructions  above, print the referral and give to the patient to schedule  the appointment.            SPEECH THERAPY REFERRAL       *This therapy referral will be filtered to a centralized scheduling office at [facility] and the patient will receive a call to schedule an appointment at a [place] location most convenient for them.   Patient needs speech therapy at [facility] as she will drive herself for therapy and she lives close by.     [facility] provides Speech Therapy evaluation and treatment and many specialty services across the [place] system.  If requesting a specialty program, please choose from the list below.  If you have not heard from the scheduling office within 2 business days, please call [phone] for all locations, with the exception of [place], please call [phone].       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  Call member services at your health plan with any benefit or coverage questions.      **Note to Provider:  If you are referring outside of [place] for the therapy appointment, please list the name of the location in the  special instructions  above, print the referral and give to the patient to schedule the appointment.                  Your next 10 appointments already scheduled     Apr 12, 2017  1:30 PM CDT   New Visit with NILA Lim MD   [facility] PHYSICIANS HEART AT [facility] ([facility])    [address] [place] [address] [phone]              Who to contact     If you have questions or need follow up information about today's clinic visit or your schedule please contact [facility] directly at [phone].  Normal or non-critical lab and imaging results will be communicated to you by MyChart, letter or  "phone within 4 business days after the clinic has received the results. If you do not hear from us within 7 days, please contact the clinic through Brightblue or phone. If you have a critical or abnormal lab result, we will notify you by phone as soon as possible.  Submit refill requests through Brightblue or call your pharmacy and they will forward the refill request to us. Please allow 3 business days for your refill to be completed.          Additional Information About Your Visit        Service Management GroupharEducreations Information     Brightblue gives you secure access to your electronic health record. If you see a primary care provider, you can also send messages to your care team and make appointments. If you have questions, please call your primary care clinic.  If you do not have a primary care provider, please call [phone] and they will assist you.        Care EveryWhere ID     This is your Care EveryWhere ID. This could be used by other organizations to access your [place] medical records  [id number]        Your Vitals Were     Pulse Temperature Height Pulse Oximetry BMI (Body Mass Index)       96 98.2  F (36.8  C) 5' 5\" (1.651 m) 97% 17.31 kg/m2        Blood Pressure from Last 3 Encounters:   04/11/17 128/78   01/09/17 134/80   09/20/16 128/70    Weight from Last 3 Encounters:   04/11/17 104 lb (47.2 kg)   01/09/17 108 lb (49 kg)   09/20/16 107 lb 9.6 oz (48.8 kg)              We Performed the Following     PHYSICAL THERAPY REFERRAL     SPEECH THERAPY REFERRAL          Today's Medication Changes          These changes are accurate as of: 4/11/17 10:51 AM.  If you have any questions, ask your nurse or doctor.               These medicines have changed or have updated prescriptions.        Dose/Directions    * ELIQUIS 2.5 MG tablet   This may have changed:  Another medication with the same name was added. Make sure you understand how and when to take each.   Generic drug:  apixaban ANTICOAGULANT   Changed by:  Bess Leiva, " MD        Dose:  2.5 mg   Take 1 tablet (2.5 mg) by mouth 2 times daily   Refills:  0       * apixaban ANTICOAGULANT 2.5 MG tablet   Commonly known as:  ELIQUIS   This may have changed:  You were already taking a medication with the same name, and this prescription was added. Make sure you understand how and when to take each.   Used for:  Paroxysmal atrial fibrillation (H)   Changed by:  Bess Leiva MD        Dose:  2.5 mg   Take 1 tablet (2.5 mg) by mouth 2 times daily   Quantity:  180 tablet   Refills:  3       simvastatin 20 MG tablet   Commonly known as:  ZOCOR   This may have changed:    - medication strength  - how much to take  - how to take this  - when to take this  - additional instructions   Used for:  Hyperlipidemia LDL goal <130   Changed by:  Bess Leiva MD        Dose:  20 mg   Take 1 tablet (20 mg) by mouth At Bedtime   Quantity:  90 tablet   Refills:  3       * Notice:  This list has 2 medication(s) that are the same as other medications prescribed for you. Read the directions carefully, and ask your doctor or other care provider to review them with you.         Where to get your medicines      These medications were sent to [facility] [place]  [facility] - [address], [place] [address]     Phone:  [phone]     amiodarone 200 MG tablet    apixaban ANTICOAGULANT 2.5 MG tablet    levETIRAcetam 500 MG tablet    lisinopril 5 MG tablet    simvastatin 20 MG tablet         Some of these will need a paper prescription and others can be bought over the counter.  Ask your nurse if you have questions.     Bring a paper prescription for each of these medications     alendronate 70 MG tablet                Primary Care Provider Office Phone # Fax #    Bess Leiva [phone]       [address] [facility] [facility] [address]        Thank you!     Thank you for choosing [facility]  for your care. Our goal is always to provide you with excellent care. Hearing back from our patients is one way we can continue to improve our services. Please take a few minutes to complete the written survey that you may receive in the mail after your visit with us. Thank you!             Your Updated Medication List - Protect others around you: Learn how to safely use, store and throw away your medicines at www.disposemymeds.org.          This list is accurate as of: 4/11/17 10:51 AM.  Always use your most recent med list.                   Brand Name Dispense Instructions for use    alendronate 70 MG tablet    FOSAMAX    12 tablet    Take 1 tablet (70 mg) by mouth every 7 days Take with over 8 ounces water and stay upright for at least 30 minutes after dose.  Take at least 60 minutes before breakfast       amiodarone 200 MG tablet    PACERONE/CODARONE    90 tablet    Take 1 tablet (200 mg) by mouth daily       atenolol 25 MG tablet    TENORMIN    180 tablet    TAKE ONE TABLET BY MOUTH TWICE A DAY       * ELIQUIS 2.5 MG tablet   Generic drug:  apixaban ANTICOAGULANT      Take 1 tablet (2.5 mg) by mouth 2 times daily       * apixaban ANTICOAGULANT 2.5 MG tablet    ELIQUIS    180 tablet    Take 1 tablet (2.5 mg) by mouth 2 times daily       levETIRAcetam 500 MG tablet    KEPPRA    180 tablet    Take 1 tablet (500 mg) by mouth 2 times daily       lisinopril 5 MG tablet    PRINIVIL/ZESTRIL    90 tablet    Take 1 tablet (5 mg) by mouth daily       polyethylene glycol powder    MIRALAX    510 g    Take 17 g by mouth daily       simvastatin 20 MG tablet    ZOCOR    90 tablet    Take 1 tablet (20 mg) by mouth At Bedtime       vitamin D 1000 UNITS capsule      Reported on 4/11/2017       * Notice:  This list has 2 medication(s) that are the same as other medications prescribed for you. Read the directions carefully, and ask your doctor or other care provider to review them with you.

## 2017-04-11 NOTE — PROGRESS NOTES
"Clinic Care Coordination Contact  OUTREACH    Referral Information:  Referral Source: PCP  Reason for Contact: follow up  Care Conference: No     Universal Utilization:   ED Visits in last year: 1  Hospital visits in last year: 1  Last PCP appointment: 09/20/16  Missed Appointments: 0          Clinical Concerns:  Current Medical Concerns: Pt in clinic today for appt with her PCP.  Pt returned from [place] last Monday.  pt s/p stroke and new pacemaker.   Pt states she is doing very well on her own.  Pt has two nieces and a sister that live locally.  Pt also states she has friends and neighbors who are also available to assist.  Pt does have [employer] in place.  Pt is managing all home needs - cooking, cleaning, laundry.  Family helping with groceries.  Pt sister has assisted pt with mediction set up.   Reviewed recommendation for outpatient PT/SLP.  REviewed options within [facility] however SLP services would be to far for the pt to drive.  Pt would prefer outpatient SLP and PT at [facility] as it is closer to pt home.  Pt has an appointment to establish care with cardiology tomorrow.   Pt voiced no other concerns or needs at this time.  Reviewed with pt \"Authorization to Discuss form\".  Pt completed form allowing staff to be able to communicate with her daughter Isaac Yung.  Pt is open to having CC f/u with her in 10-14 days.  Reviewed above with pt and Dr. Leiva.    Current Behavioral Concerns: No concerns  Education Provided to patient: CC role.  Clinical Pathway Name: None    Medication Management:  Pt brought a list of her medications to her appointment today to review with her PCP.  As previously noted pt sister is helping pt set up her medications every week.    Functional Status:  Mobility Status: Independent     Transportation: Independent         Psychosocial:  Current living arrangement::  I live alone  Financial/Insurance: No concerns noted     Resources and Interventions:  Current Resources: " Other (Comments) (Outpatient rehab - SLP and PT pt prefers [facility]);          Advanced Care Plans/Directives on file:: Yes  Referrals Placed: Community Resources, Financial Services, Home Care, Housekeeping or Chore Agency, Meals on Wheels, Transportation (Emailed information to patient shai on 3/29)     Goals: TBD   Patient/Caregiver understanding: Pt able to indicate her needs/concerns and role of CC in her plan of care.  Frequency of Care Coordination: PRN  Upcoming appointment: 04/11/17 - Cardiologist at .     Plan:   Pt will follow plan of care as directed by her PCP.  Pt has an appointment with Dr. Woodard, Cardiologist at  on 4/12/17.  Pt will arrange outpatient PT and SLP at [facility].  RN CC to f/u with pt in 10-14 days.    Nataly Murcia RN BSN, PHN RN Care Coordinator  [facility]  [email or web address]  [phone]  4/11/2017 10:57 AM

## 2017-04-11 NOTE — NURSING NOTE
"Chief Complaint   Patient presents with     Hospital F/U       Initial /78 (BP Location: Right arm, Patient Position: Chair, Cuff Size: Adult Regular)  Pulse 96  Temp 98.2  F (36.8  C)  Ht 5' 5\" (1.651 m)  Wt 104 lb (47.2 kg)  SpO2 97%  BMI 17.31 kg/m2 Estimated body mass index is 17.31 kg/(m^2) as calculated from the following:    Height as of this encounter: 5' 5\" (1.651 m).    Weight as of this encounter: 104 lb (47.2 kg).  Medication Reconciliation: complete   Mitali Crystal MA      "

## 2017-04-12 NOTE — PATIENT INSTRUCTIONS
1.  Dr. SOBIA House would like for you to have your Pace maker checked/ this is scheduled for you on Tuesday April 18th, 2017 at 2:00pm. [address].    2. Dr. SOBIA House would like for you to have an ECHO (Ultrasound of your heart) after your device check on the 18th. This is scheduled for you at 4:00pm on the 18th. We will call you to discuss the results.    3. Dr. SOBIA House would like to see you back for a follow up in 1 year (April 2018). We will call you to schedule this appointment as time draws closer to the date.      [facility] Cardiology - [place] Location    If you have any questions regarding to your visit please contact your care team:     Cardiology  Telephone Number   Sadie Saeed  Cardiology RN's.    Lilo Mohamud CMA [phone]    After hours: [phone].  [phone]   For scheduling appts:     [phone] or  [phone]    After hours: [phone]   For the Device Clinic (Pacemakers and ICD's)  RN's :  Gisela Collado   During business hours: [phone]  After business hours:  [phone]- select option 4.      If you need a medication refill please contact your pharmacy.  Please allow 3 business days for your refill to be completed.    As always, Thank you for trusting us with your health care needs!  _____________________________________________________________________

## 2017-04-12 NOTE — MR AVS SNAPSHOT
After Visit Summary   4/12/2017    Navid Estrella    MRN: [id number]           Patient Information     Date Of Birth          5/14/1930        Visit Information        Provider Department      4/12/2017 1:30 PM NILA Lim MD [facility] PHYSICIANS HEART AT [facility]        Today's Diagnoses     Paroxysmal atrial fibrillation (H)    -  1      Care Instructions    1.  Dr. SOBIA House would like for you to have your Pace maker checked/ this is scheduled for you on Tuesday April 18th, 2017 at 2:00pm. [address]    2. Dr. SOBIA House would like for you to have an ECHO (Ultrasound of your heart) after your device check on the 18th. This is scheduled for you at 4:00pm on the 18th. We will call you to discuss the results.    3. Dr. SOBIA House would like to see you back for a follow up in 1 year (April 2018). We will call you to schedule this appointment as time draws closer to the date.      [facility] Location    If you have any questions regarding to your visit please contact your care team:     Cardiology  Telephone Number   Sadie Saeed  Cardiology RN's.    Lilo Mohamud CMA [phone]    After hours: [phone].  [phone]   For scheduling appts:     [phone] or  [phone]    After hours: [phone]   For the Device Clinic (Pacemakers and ICD's)  RN's :  Gisela Collado   During business hours: [phone]  After business hours:  [phone]- select option 4.      If you need a medication refill please contact your pharmacy.  Please allow 3 business days for your refill to be completed.    As always, Thank you for trusting us with your health care needs!  _____________________________________________________________________            Follow-ups after your visit        Your next 10 appointments already scheduled     Apr 18, 2017  2:00 PM CDT   Return Visit with DEVICE CHECK RN [facility]   DERE[place]  [facility] HEART AT [facility] ([facility])    [address]  [facility] [address]   [phone]            Apr 18, 2017  4:00 PM CDT   Ech Complete with [id number]   [facility] ([facility])    [address] [address]   [phone]           1.  Please bring or wear a comfortable two-piece outfit. 2.  You may eat, drink and take your normal medicines. 3.  For any questions that cannot be answered, please contact the ordering physician              Future tests that were ordered for you today     Open Future Orders        Priority Expected Expires Ordered    Echocardiogram Complete Routine  4/12/2018 4/12/2017            Who to contact     If you have questions or need follow up information about today's clinic visit or your schedule please contact [facility] AT [facility] directly at [phone].  Normal or non-critical lab and imaging results will be communicated to you by DKT Technologyhart, letter or phone within 4 business days after the clinic has received the results. If you do not hear from us within 7 days, please contact the clinic through Tipzut or phone. If you have a critical or abnormal lab result, we will notify you by phone as soon as possible.  Submit refill requests through [employer] or call your pharmacy and they will forward the refill request to us. Please allow 3 business days for your refill to be completed.          Additional Information About Your Visit        DKT TechnologyharallGreenup Information     [employer] gives you secure access to your electronic health record. If you see a primary care provider, you can also send messages to your care team and make appointments. If you have questions, please call your primary care clinic.  If you do not have a primary care provider, please call [phone] and they will assist you.        Care EveryWhere ID     This is your  Care EveryWhere ID. This could be used by other organizations to access your [place] medical records  [id number]        Your Vitals Were     Pulse Pulse Oximetry                78 96%           Blood Pressure from Last 3 Encounters:   04/12/17 158/83   04/11/17 128/78   01/09/17 134/80    Weight from Last 3 Encounters:   04/11/17 47.2 kg (104 lb)   01/09/17 49 kg (108 lb)   09/20/16 48.8 kg (107 lb 9.6 oz)               Primary Care Provider Office Phone # Fax #    Bess Leiva [phone]       [address] [address]        Thank you!     Thank you for choosing [facility] PHYSICIANS HEART AT [facility]  for your care. Our goal is always to provide you with excellent care. Hearing back from our patients is one way we can continue to improve our services. Please take a few minutes to complete the written survey that you may receive in the mail after your visit with us. Thank you!             Your Updated Medication List - Protect others around you: Learn how to safely use, store and throw away your medicines at www.disposemymeds.org.          This list is accurate as of: 4/12/17  2:03 PM.  Always use your most recent med list.                   Brand Name Dispense Instructions for use    alendronate 70 MG tablet    FOSAMAX    12 tablet    Take 1 tablet (70 mg) by mouth every 7 days Take with over 8 ounces water and stay upright for at least 30 minutes after dose.  Take at least 60 minutes before breakfast       amiodarone 200 MG tablet    PACERONE/CODARONE    90 tablet    Take 1 tablet (200 mg) by mouth daily       apixaban ANTICOAGULANT 2.5 MG tablet    ELIQUIS    180 tablet    Take 1 tablet (2.5 mg) by mouth 2 times daily       atenolol 25 MG tablet    TENORMIN    180 tablet    TAKE ONE TABLET BY MOUTH TWICE A DAY       levETIRAcetam 500 MG tablet    KEPPRA    180 tablet    Take 1 tablet (500 mg) by mouth 2 times daily        lisinopril 5 MG tablet    PRINIVIL/ZESTRIL    90 tablet    Take 1 tablet (5 mg) by mouth daily       polyethylene glycol powder    MIRALAX    510 g    Take 17 g by mouth daily       simvastatin 20 MG tablet    ZOCOR    90 tablet    Take 1 tablet (20 mg) by mouth At Bedtime       vitamin D 1000 UNITS capsule      Reported on 4/11/2017

## 2017-04-12 NOTE — NURSING NOTE
"Chief Complaint   Patient presents with     Atrial Fib     Referral rom Dr. Yo for: Cardiac pacemaker in situ, and Afib.  Reports feeling well just tired and occ feel winded. Denies any other cardiac sx.       Initial /83 (BP Location: Right arm, Patient Position: Chair, Cuff Size: Adult Regular)  Pulse 78  SpO2 96% Estimated body mass index is 17.31 kg/(m^2) as calculated from the following:    Height as of 4/11/17: 1.651 m (5' 5\").    Weight as of 4/11/17: 47.2 kg (104 lb)..  BP completed using cuff size: regular    Cecy Mohamud CMA    "

## 2017-04-12 NOTE — NURSING NOTE
Cardiac Testing: Patient given instructions regarding  echocardiogram  and pacer check. Discussed purpose, preparation, procedure and when to expect results reported back to the patient. Patient demonstrated understanding of this information and agreed to call with further questions or concerns.  Med Reconcile: Reviewed and verified all current medications with the patient. The updated medication list was printed and given to the patient.  Return Appointment: Patient given instructions regarding scheduling next clinic visit. Patient demonstrated understanding of this information and agreed to call with further questions or concerns.  1 year  Patient stated she understood all health information given and agreed to call with further questions or concerns.  Diana Link RN

## 2017-04-12 NOTE — PROGRESS NOTES
SUBJECTIVE:  Navid Estrella is a 86 year old female who presents for establishing care.  Was in [place] visiting daughter. Had a CVA with speech difficulty. Still unable to fing words and history difficult.    Diagnosed with SSS and had a pacemaker. Zio showing Afib burden of 8%.  Denied cardiac symptoms.  HTN+No other significant past medical history or family history. DM. Non smoker.    Patient Active Problem List    Diagnosis Date Noted     Sick sinus syndrome (H) 04/11/2017     Priority: Medium     Cardiac pacemaker in situ 03/30/2017     Priority: Medium     A-fib (H) 03/30/2017     Priority: Medium     Cerebrovascular accident (CVA), unspecified mechanism (H) 03/28/2017     Priority: Medium     Benign essential hypertension 07/28/2016     Priority: Medium     Closed displaced fracture of phalanx of lesser toe of right foot, unspecified phalanx, initial encounter 05/24/2016     Priority: Medium     PVD (posterior vitreous detachment), left 12/29/2015     Priority: Medium     History of skin cancer 05/08/2014     Priority: Medium     Actinic keratosis 05/08/2014     Priority: Medium     Impacted cerumen 04/23/2014     Priority: Medium     Constipation 04/07/2014     Priority: Medium     Keratoacanthoma of cheek 09/11/2013     Priority: Medium     Osteoporosis      Priority: Medium     Problem list name updated by automated process. Provider to review and confirm  [employer] Update utility       Pseuodophakia OU 05/17/2012     Priority: Medium     Advanced directives, counseling/discussion 07/15/2011     Priority: Medium     Advance Directive Initial Visit  Navid Estrella presents in person for initial session regarding advance care planning/completion of a Health Care Directive and/or POLST.  She was referred to the facilitator by [facility] Facilitator.  She currently has existing document for review and possible revision. Plan:  [facility] Advance Care Planning information provided to patient.     Advance Directive Follow-up Visit  Navid Estrella presents for advance care planning session accompanied by no one.   Patient voices understanding of advance care planning and advance directive form  Designated healthcare agent is identified and accepts responsibility of this role.  Healthcare agent s name is Kathya Yung.  Designated healthcare agent concerns:  None per patient.  Health Care Directive/POLST reviewed and patient and designated healthcare agent are in agreement per patient.  Finalized wishes are clear to both patient and designated healthcare agent: Yes: per patient.  Patient and designated healthcare agent are aware that document may be changed at any time in the future.  Advance directive form: completed at this visit.  Advance directive form: HCD: notarized at this visit.  Original and copies of advance directive form given to patient and copy sent to  for scanning into EMR and original given to patient and instructed to give copy to designated [facility] and non-[place] physician where applicable.  Marah Bach RN  9/26/2012        Patient states has Advance Directive and will bring in a copy to clinic.       HL (hearing loss) 07/15/2011     Priority: Medium     Status post total knee replacement 05/24/2011     Priority: Medium     HYPERLIPIDEMIA LDL GOAL <130 10/31/2010     Priority: Medium     OA (osteoarthritis)      Priority: Medium    .  Current Outpatient Prescriptions   Medication Sig     lisinopril (PRINIVIL/ZESTRIL) 5 MG tablet Take 1 tablet (5 mg) by mouth daily     amiodarone (PACERONE/CODARONE) 200 MG tablet Take 1 tablet (200 mg) by mouth daily     alendronate (FOSAMAX) 70 MG tablet Take 1 tablet (70 mg) by mouth every 7 days Take with over 8 ounces water and stay upright for at least 30 minutes after dose.  Take at least 60 minutes before breakfast     apixaban ANTICOAGULANT (ELIQUIS) 2.5 MG tablet Take 1 tablet (2.5 mg) by mouth 2 times daily     levETIRAcetam (KEPPRA)  500 MG tablet Take 1 tablet (500 mg) by mouth 2 times daily     simvastatin (ZOCOR) 20 MG tablet Take 1 tablet (20 mg) by mouth At Bedtime     apixaban ANTICOAGULANT (ELIQUIS) 2.5 MG tablet Take 1 tablet (2.5 mg) by mouth 2 times daily     atenolol (TENORMIN) 25 MG tablet TAKE ONE TABLET BY MOUTH TWICE A DAY     polyethylene glycol (MIRALAX) powder Take 17 g by mouth daily     VITAMIN D 1000 UNIT OR CAPS Reported on 4/11/2017     No current facility-administered medications for this visit.      Past Medical History:   Diagnosis Date     A-fib (H) 3/30/2017     Cancer (H) 2007    keratoacanthoma from left cheek     Colon polyps      CVA (cerebral vascular accident) (H) 02/2017    In [place], right hemiparesis, 2/12/17     Diverticulitis 2010     DJD (degenerative joint disease) of knee      HTN (hypertension)      Hypercholesteremia      Impaired glucose tolerance      Osteoporosis      Past Surgical History:   Procedure Laterality Date     APPENDECTOMY       ARTHROSCOPY KNEE RT/LT  1998    right     ARTHROSCOPY KNEE RT/LT  9/03    left     BREAST LUMPECTOMY, RT/LT  9/01    right breast in situ for carcinoma     C IMPLANT COCHLEAR DEVICE  3/2016    right     C TOTAL KNEE ARTHROPLASTY  4/11/11    left     CATARACT IOL, RT/LT  7/08    right     CATARACT IOL, RT/LT  8/08    left     COLONOSCOPY  3/19/2009     ENT SURGERY  5-23-13    Left ear canal biopsy     HC ERCP W  BIOPSY, SINGLE/MULTIPLE  6/10    possible pancreatic mass     HYSTERECTOMY, VAGINAL  3/01     JOINT REPLACEMTN, KNEE RT/LT  2/2010    RT     LAPAROSCOPIC TRANSGASTRIC ENDOSCOPIC RETROGRADE CHOLANGIOPANCREATOGRAPHY (ERCP)  7/11    follow up on possible pancreatic mass     LAPAROSCOPIC TRANSGASTRIC ENDOSCOPIC RETROGRADE CHOLANGIOPANCREATOGRAPHY (ERCP)  7/12    possible pancreatic mass biopsy     ROTATOR CUFF REPAIR RT/LT  1/07    left     TONSILLECTOMY & ADENOIDECTOMY       Allergies   Allergen Reactions     Morphine Nausea     Social History     Social  History     Marital status:      Spouse name: N/A     Number of children: 1     Years of education: N/A     Occupational History     Not on file.     Social History Main Topics     Smoking status: Former Smoker     Quit date: 8/1/1991     Smokeless tobacco: Never Used      Comment: quit 20 years ago     Alcohol use 0.0 oz/week     0 Standard drinks or equivalent per week      Comment: Occassionally     Drug use: No     Sexual activity: No     Other Topics Concern     Not on file     Social History Narrative     Family History   Problem Relation Age of Onset     Hypertension Mother      CEREBROVASCULAR DISEASE Mother      CANCER Father      pancreatic Cancer Age 76     Hypertension Sister      CEREBROVASCULAR DISEASE Sister      HEART DISEASE Sister      Hypertension Sister      LUNG DISEASE Sister           REVIEW OF SYSTEMS:  General: negative, fever, chills, night sweats  Skin: negative, acne, rash and scaling  Eyes: negative, double vision, eye pain and photophobia  Ears/Nose/Throat: negative, nasal congestion and purulent rhinorrhea  Respiratory: No dyspnea on exertion, No cough, No hemoptysis and negative  Cardiovascular: negative, palpitations, tachycardia, irregular heart beat, chest pain, exertional chest pain or pressure, paroxysmal nocturnal dyspnea, dyspnea on exertion and orthopnea  Gastrointestinal: negative, dysphagia, nausea and vomiting  Genitourinary: negative, nocturia, dysuria, frequency and urgency  Musculoskeletal: negative, fracture, back pain and neck pain  Neurologic: negative, headaches, syncope, seizures and paralysis  Psychiatric: negative, nervous breakdown, thoughts of self-harm and thoughts of hurting someone else  Hematologic/Lymphatic/Immunologic: negative, bleeding disorder, chills and fever  Endocrine: negative, cold intolerance, heat intolerance and hot flashes       OBJECTIVE:  not currently breastfeeding.  General Appearance: alert, active and no distress  Head:  Normocephalic. No masses, lesions, tenderness or abnormalities  Eyes: conjuctiva clear, PERRL, EOM intact  Ears: External ears normal. Canals clear. TM's normal.  Nose: Nares normal  Mouth: normal  Neck: Supple, no cervical adenopathy, no thyromegaly  Lungs: clear to auscultation  Cardiac: regular rate and rhythm, normal S1 and S2, no murmur  Abdomen: Soft, nontender.  Normal bowel sounds.  No hepatosplenomegaly or abnormal masses  Extremities: no peripheral edema, peripheral pulses normal  Musculoskeletal: negative  Neurological: Cranial nerves 2-12 intact, motor strength intact       ASSESSMENT/PLAN:  Patient with SSS,  Recent PPM and CVA. Intermittent afib. On NOAC-Eliquis.  No cardiac cconplaints.  Will continue anticoagulation.  Will arrange for pacemaker check and an Echo.  Carmela reviewed. Afib burden 8%.  EKGs here reviewed. NSR.  Patient can continue with rehab.  Per orders.   Return to Clinic 1yr.

## 2017-04-18 NOTE — MR AVS SNAPSHOT
After Visit Summary   4/18/2017    Navid Estrella    MRN: [id number]           Patient Information     Date Of Birth          5/14/1930        Visit Information        Provider Department      4/18/2017 2:00 PM DEVICE CHECK RN CARD [facility]        Care Instructions    It was a pleasure to see you in clinic today. Please do not hesitate to call with any questions or concerns. We look forward to seeing you in clinic at your next device check in 3 months.    Gisela Swain, RN  Electrophysiology Nurse Clinician  [facility]  During business hours please call The Device Clinic:  [phone]  After business hours please call:  [phone]- select option #[phone] and ask for job code [phone]  Appointment lines  [facility]:  [phone]  [facility]:  [phone]          Follow-ups after your visit        Your next 10 appointments already scheduled     Apr 18, 2017  4:00 PM CDT   Ech Complete with [id number]   [facility] ([facility])    [address] [place] [address] [id number] [phone]           1.  Please bring or wear a comfortable two-piece outfit. 2.  You may eat, drink and take your normal medicines. 3.  For any questions that cannot be answered, please contact the ordering physician            Aug 15, 2017  2:30 PM CDT   Return Visit with DEVICE CHECK RN CARD   [facility] ([facility])    [address] [place] [id number] [phone]              Who to contact     If you have questions or need follow up information about today's clinic visit or your schedule please contact [facility] directly at [phone].  Normal or non-critical lab and imaging results will be communicated to you by MyChart, letter or phone within 4 business  days after the clinic has received the results. If you do not hear from us within 7 days, please contact the clinic through [employer] or phone. If you have a critical or abnormal lab result, we will notify you by phone as soon as possible.  Submit refill requests through [employer] or call your pharmacy and they will forward the refill request to us. Please allow 3 business days for your refill to be completed.          Additional Information About Your Visit        [employer] Information     [employer] gives you secure access to your electronic health record. If you see a primary care provider, you can also send messages to your care team and make appointments. If you have questions, please call your primary care clinic.  If you do not have a primary care provider, please call [phone] and they will assist you.        Care EveryWhere ID     This is your Care EveryWhere ID. This could be used by other organizations to access your [place] medical records  [id number]         Blood Pressure from Last 3 Encounters:   04/12/17 158/83   04/11/17 128/78   01/09/17 134/80    Weight from Last 3 Encounters:   04/11/17 47.2 kg (104 lb)   01/09/17 49 kg (108 lb)   09/20/16 48.8 kg (107 lb 9.6 oz)              Today, you had the following     No orders found for display       Primary Care Provider Office Phone # Fax #    Bess Leiva [phone]       [address] [address]        Thank you!     Thank you for choosing [facility]  for your care. Our goal is always to provide you with excellent care. Hearing back from our patients is one way we can continue to improve our services. Please take a few minutes to complete the written survey that you may receive in the mail after your visit with us. Thank you!             Your Updated Medication List - Protect others around you: Learn how to safely use, store and throw  away your medicines at www.disposemymeds.org.          This list is accurate as of: 4/18/17  2:22 PM.  Always use your most recent med list.                   Brand Name Dispense Instructions for use    alendronate 70 MG tablet    FOSAMAX    12 tablet    Take 1 tablet (70 mg) by mouth every 7 days Take with over 8 ounces water and stay upright for at least 30 minutes after dose.  Take at least 60 minutes before breakfast       amiodarone 200 MG tablet    PACERONE/CODARONE    90 tablet    Take 1 tablet (200 mg) by mouth daily       apixaban ANTICOAGULANT 2.5 MG tablet    ELIQUIS    180 tablet    Take 1 tablet (2.5 mg) by mouth 2 times daily       atenolol 25 MG tablet    TENORMIN    180 tablet    TAKE ONE TABLET BY MOUTH TWICE A DAY       levETIRAcetam 500 MG tablet    KEPPRA    180 tablet    Take 1 tablet (500 mg) by mouth 2 times daily       lisinopril 5 MG tablet    PRINIVIL/ZESTRIL    90 tablet    Take 1 tablet (5 mg) by mouth daily       polyethylene glycol powder    MIRALAX    510 g    Take 17 g by mouth daily       simvastatin 20 MG tablet    ZOCOR    90 tablet    Take 1 tablet (20 mg) by mouth At Bedtime       vitamin D 1000 UNITS capsule      Reported on 4/11/2017

## 2017-04-18 NOTE — PATIENT INSTRUCTIONS
It was a pleasure to see you in clinic today. Please do not hesitate to call with any questions or concerns. We look forward to seeing you in clinic at your next device check in 3 months.    Gisela Swain, RN  Electrophysiology Nurse Clinician  [facility]  During business hours please call The Device Clinic:  [phone]  After business hours please call:  [phone]- select option #[phone] and ask for job code [phone]  Appointment lines  [facility]:  [phone]  [facility]:  [phone]

## 2017-04-19 NOTE — PROGRESS NOTES
Pt seen in the [place] clinic for evaluation and iterative programming of a Biotronik, dual lead pacemaker, per MD orders. She is here to establish care with our device clinic. Today her intrinsic rhythm is SB 54 bpm. Normal pacemaker function. AT episodes recorded. Pt states she recently had a stroke. She states she has a family history of AF. After the CVA she was started on Eliquis. Pt reports she is feeling well.  Battery estimates 8 years 8 months to JULIO C. Plan for pt to RTC in 3 months.

## 2017-04-26 NOTE — TELEPHONE ENCOUNTER
Reason for Call: Request for an order or referral:    Order or referral being requested: Patient forgot to take with her the referrals for PT and Speech therapy.  Jennifer states patient is present for therapy, and requesting they be faxed to Fax: [phone]    Date needed: as soon as possible    Has the patient been seen by the PCP for this problem? YES    Additional comments: Nsa    Phone number Patient can be reached at:  Other phone number:  [phone]    Best Time:  ASAP    Can we leave a detailed message on this number?  YES    Call taken on 4/26/2017 at 2:09 PM by Amy Beltran

## 2017-05-08 NOTE — PROGRESS NOTES
Clinic Care Coordination Contact  [facility]/Voicemail    Referral Source: PCP  Clinical Data: Care Coordinator Outreach  Outreach attempted x 1.  Left message on voicemail with call back information and requested return call.  Plan: Care Coordinator mailed out care coordination introduction letter on 3/29/17. Care Coordinator will try to reach patient again in 3-5 business days.      Nataly Murcia, RN BSN, PHN RN Care Coordinator  [facility]  [email or web address]  [phone]  5/8/2017 9:42 AM

## 2017-05-09 NOTE — PROGRESS NOTES
SUBJECTIVE:                                                    Navid Estrella is a 86 year old female who presents to clinic today for the following health issues:      Hypertension Follow-up      Outpatient blood pressures are being checked at home.  Results are 139 to 111/90 to 74.    Low Salt Diet: no added salt       Amount of exercise or physical activity: None    Problems taking medications regularly: No    Medication side effects: none    Diet: regular (no restrictions)           Problem list and histories reviewed & adjusted, as indicated.  Additional history: as documented    Patient Active Problem List   Diagnosis     OA (osteoarthritis)     HYPERLIPIDEMIA LDL GOAL <130     Status post total knee replacement     Advanced directives, counseling/discussion     HL (hearing loss)     Pseuodophakia OU     Osteoporosis     Keratoacanthoma of cheek     Constipation     Impacted cerumen     History of skin cancer     Actinic keratosis     PVD (posterior vitreous detachment), left     Closed displaced fracture of phalanx of lesser toe of right foot, unspecified phalanx, initial encounter     Benign essential hypertension     Cerebrovascular accident (CVA), unspecified mechanism (H)     Cardiac pacemaker in situ- Biotronik, dual chamber- NOT dependent     A-fib (H)     Sick sinus syndrome (H)     Past Surgical History:   Procedure Laterality Date     APPENDECTOMY       ARTHROSCOPY KNEE RT/LT  1998    right     ARTHROSCOPY KNEE RT/LT  9/03    left     BREAST LUMPECTOMY, RT/LT  9/01    right breast in situ for carcinoma     C IMPLANT COCHLEAR DEVICE  3/2016    right     C TOTAL KNEE ARTHROPLASTY  4/11/11    left     CATARACT IOL, RT/LT  7/08    right     CATARACT IOL, RT/LT  8/08    left     COLONOSCOPY  3/19/2009     ENT SURGERY  5-23-13    Left ear canal biopsy     HC ERCP W  BIOPSY, SINGLE/MULTIPLE  6/10    possible pancreatic mass     HYSTERECTOMY, VAGINAL  3/01     JOINT REPLACEMTN, KNEE RT/LT  2/2010    RT      LAPAROSCOPIC TRANSGASTRIC ENDOSCOPIC RETROGRADE CHOLANGIOPANCREATOGRAPHY (ERCP)  7/11    follow up on possible pancreatic mass     LAPAROSCOPIC TRANSGASTRIC ENDOSCOPIC RETROGRADE CHOLANGIOPANCREATOGRAPHY (ERCP)  7/12    possible pancreatic mass biopsy     ROTATOR CUFF REPAIR RT/LT  1/07    left     TONSILLECTOMY & ADENOIDECTOMY         Social History   Substance Use Topics     Smoking status: Former Smoker     Quit date: 8/1/1991     Smokeless tobacco: Never Used      Comment: quit 20 years ago     Alcohol use 0.0 oz/week     0 Standard drinks or equivalent per week      Comment: Occassionally     Family History   Problem Relation Age of Onset     Hypertension Mother      CEREBROVASCULAR DISEASE Mother      CANCER Father      pancreatic Cancer Age 76     Hypertension Sister      CEREBROVASCULAR DISEASE Sister      HEART DISEASE Sister      Hypertension Sister      LUNG DISEASE Sister          BP Readings from Last 3 Encounters:   05/12/17 130/80   04/12/17 158/83   04/11/17 128/78    Wt Readings from Last 3 Encounters:   05/12/17 102 lb 3.2 oz (46.4 kg)   04/11/17 104 lb (47.2 kg)   01/09/17 108 lb (49 kg)                  Labs reviewed in EPIC    Reviewed and updated as needed this visit by clinical staff       Reviewed and updated as needed this visit by Provider         ROS:  This 86 year old female is here today because her blood pressure has been high in therapy recently. She had a small stroke while visiting her daughter in [place] this winter. She is home now and back to driving. She drives herself to therapy where her blood pressures have been high: 171/100, 157/95, 166/82. Her home blood pressures have been lower at 139/74 and 121/76. She wonders what she should do. She has been busy cleaning her garage and she hopes to start planting flowers outside this weekend. Her niece comes by and checks on her often. All other review of systems are negative  Personal, family, and social history reviewed with  "patient and revised.         OBJECTIVE:                                                    /80  Pulse 79  Temp 97.6  F (36.4  C) (Oral)  Ht 5' 5\" (1.651 m)  Wt 102 lb 3.2 oz (46.4 kg)  SpO2 95%  BMI 17.01 kg/m2  Body mass index is 17.01 kg/(m^2).  GENERAL: healthy, alert and no distress, but very hard of hearing.   NECK: no adenopathy, no asymmetry, masses, or scars and thyroid normal to palpation  RESP: lungs clear to auscultation - no rales, rhonchi or wheezes  CV: regular rate and rhythm, normal S1 S2, no S3 or S4, no murmur, click or rub, no peripheral edema   MS: no gross musculoskeletal defects noted, no edema    Diagnostic Test Results:  none      ASSESSMENT/PLAN:                                                             1. Benign essential hypertension  Fair control. I will increase her lisinopril to 10 mg daily   - atenolol (TENORMIN) 25 MG tablet; Take 1 tablet (25 mg) by mouth 2 times daily  Dispense: 180 tablet; Refill: 4  - lisinopril (PRINIVIL/ZESTRIL) 10 MG tablet; Take 1 tablet (10 mg) by mouth daily  Dispense: 90 tablet; Refill: 3    Return to clinic 3 months.     TATE WILKS MD  [facility]  "

## 2017-05-12 NOTE — NURSING NOTE
"Chief Complaint   Patient presents with     Hypertension     recheck. bp has been running high at Therapy Session       Initial /90  Pulse 79  Temp 97.6  F (36.4  C) (Oral)  Ht 5' 5\" (1.651 m)  Wt 102 lb 3.2 oz (46.4 kg)  SpO2 95%  BMI 17.01 kg/m2 Estimated body mass index is 17.01 kg/(m^2) as calculated from the following:    Height as of this encounter: 5' 5\" (1.651 m).    Weight as of this encounter: 102 lb 3.2 oz (46.4 kg).  Medication Reconciliation: complete     An ANGELO Swanson    "

## 2017-05-12 NOTE — PATIENT INSTRUCTIONS
[facility]    If you have any questions regarding to your visit please contact your care team:       Team Purple:   Clinic Hours Telephone Number   [address] Dr[address]   7am-7pm  Monday - Thursday   7am-5pm  Fridays  [phone]  (Appointment scheduling available 24/7)    Questions about your Visit?   Team Line:  [phone]   Urgent Care - [place] and [facility] - 11am-9pm Monday-Friday Saturday-Sunday- 9am-5pm   [place] - 5pm-9pm Monday-Friday Saturday-Sunday- 9am-5pm  [phone] - [facility]  [phone] - [place]       What options do I have for visits at the clinic other than the traditional office visit?  To expand how we care for you, many of our providers are utilizing electronic visits (e-visits) and telephone visits, when medically appropriate, for interactions with their patients rather than a visit in the clinic.   We also offer nurse visits for many medical concerns. Just like any other service, we will bill your insurance company for this type of visit based on time spent on the phone with your provider. Not all insurance companies cover these visits. Please check with your medical insurance if this type of visit is covered. You will be responsible for any charges that are not paid by your insurance.      E-visits via Play[employer] App:  generally incur a $35.00 fee.  Telephone visits:  Time spent on the phone: *charged based on time that is spent on the phone in increments of 10 minutes. Estimated cost:   5-10 mins $30.00   11-20 mins. $59.00   21-30 mins. $85.00     Use [employer] (secure email communication and access to your chart) to send your primary care provider a message or make an appointment. Ask someone on your Team how to sign up for Play[employer] App.  For a Price Quote for your services, please call our Consumer Price Line at [phone].  As always, Thank you for trusting us with your health care needs!

## 2017-05-12 NOTE — MR AVS SNAPSHOT
After Visit Summary   5/12/2017    Navid Estrella    MRN: [id number]           Patient Information     Date Of Birth          5/14/1930        Visit Information        Provider Department      5/12/2017 10:30 AM Bess Leiva MD [facility]        Today's Diagnoses     Benign essential hypertension    -  1      Care Instructions    [facility]    If you have any questions regarding to your visit please contact your care team:       Team Purple:   Clinic Hours Telephone Number   [address] Dr.   7am-7pm  Monday - Thursday   7am-5pm  Fridays  [phone]  (Appointment scheduling available 24/7)    Questions about your Visit?   Team Line:  [phone]   Urgent Care - [place] [facility] - 11am-9pm Monday-Friday Saturday-Sunday- 9am-5pm   [place] - 5pm-9pm Monday-Friday Saturday-Sunday- 9am-5pm  [phone] - [facility]  [phone] - [place]       What options do I have for visits at the clinic other than the traditional office visit?  To expand how we care for you, many of our providers are utilizing electronic visits (e-visits) and telephone visits, when medically appropriate, for interactions with their patients rather than a visit in the clinic.   We also offer nurse visits for many medical concerns. Just like any other service, we will bill your insurance company for this type of visit based on time spent on the phone with your provider. Not all insurance companies cover these visits. Please check with your medical insurance if this type of visit is covered. You will be responsible for any charges that are not paid by your insurance.      E-visits via [email or web address]:  generally incur a $35.00 fee.  Telephone visits:  Time spent on the phone: *charged based on time that is spent on the phone in increments of 10 minutes. Estimated cost:   5-10 mins $30.00   11-20 mins. $59.00   21-30 mins.  $85.00     Use [employer] (secure email communication and access to your chart) to send your primary care provider a message or make an appointment. Ask someone on your Team how to sign up for [employer].  For a Price Quote for your services, please call our Consumer Price Line at [phone].  As always, Thank you for trusting us with your health care needs!            Follow-ups after your visit        Your next 10 appointments already scheduled     Aug 15, 2017  2:30 PM CDT   Return Visit with DEVICE CHECK RN CARD   [facility] AT [facility] ([facility])    [address] [place] [address] 2nd Floor  [facility] [address] [phone]              Who to contact     If you have questions or need follow up information about today's clinic visit or your schedule please contact [facility] directly at [phone].  Normal or non-critical lab and imaging results will be communicated to you by [employer], letter or phone within 4 business days after the clinic has received the results. If you do not hear from us within 7 days, please contact the clinic through [employer] or phone. If you have a critical or abnormal lab result, we will notify you by phone as soon as possible.  Submit refill requests through [employer] or call your pharmacy and they will forward the refill request to us. Please allow 3 business days for your refill to be completed.          Additional Information About Your Visit        [employer] Information     [employer] gives you secure access to your electronic health record. If you see a primary care provider, you can also send messages to your care team and make appointments. If you have questions, please call your primary care clinic.  If you do not have a primary care provider, please call [phone] and they will assist you.        Care EveryWhere ID     This is your Care EveryWhere ID. This could be used by other organizations to access your [facility]  "records  [id number]        Your Vitals Were     Pulse Temperature Height Pulse Oximetry BMI (Body Mass Index)       79 97.6  F (36.4  C) (Oral) 5' 5\" (1.651 m) 95% 17.01 kg/m2        Blood Pressure from Last 3 Encounters:   05/12/17 130/80   04/12/17 158/83   04/11/17 128/78    Weight from Last 3 Encounters:   05/12/17 102 lb 3.2 oz (46.4 kg)   04/11/17 104 lb (47.2 kg)   01/09/17 108 lb (49 kg)              Today, you had the following     No orders found for display         Today's Medication Changes          These changes are accurate as of: 5/12/17 11:00 AM.  If you have any questions, ask your nurse or doctor.               These medicines have changed or have updated prescriptions.        Dose/Directions    atenolol 25 MG tablet   Commonly known as:  TENORMIN   This may have changed:  See the new instructions.   Used for:  Benign essential hypertension   Changed by:  Bess Leiva MD        Dose:  25 mg   Take 1 tablet (25 mg) by mouth 2 times daily   Quantity:  180 tablet   Refills:  4       lisinopril 10 MG tablet   Commonly known as:  PRINIVIL/ZESTRIL   This may have changed:    - medication strength  - how much to take   Used for:  Benign essential hypertension   Changed by:  Bess Leiva MD        Dose:  10 mg   Take 1 tablet (10 mg) by mouth daily   Quantity:  90 tablet   Refills:  3            Where to get your medicines      These medications were sent to [facility] [facility] [address]     Phone:  [phone]     atenolol 25 MG tablet    lisinopril 10 MG tablet                Primary Care Provider Office Phone # Fax #    Bess Leiva [phone]       [address] [address]        Thank you!     Thank you for choosing [facility]  for your care. Our goal is always to provide you with excellent care. Hearing back " from our patients is one way we can continue to improve our services. Please take a few minutes to complete the written survey that you may receive in the mail after your visit with us. Thank you!             Your Updated Medication List - Protect others around you: Learn how to safely use, store and throw away your medicines at www.disposemymeds.org.          This list is accurate as of: 5/12/17 11:00 AM.  Always use your most recent med list.                   Brand Name Dispense Instructions for use    alendronate 70 MG tablet    FOSAMAX    12 tablet    Take 1 tablet (70 mg) by mouth every 7 days Take with over 8 ounces water and stay upright for at least 30 minutes after dose.  Take at least 60 minutes before breakfast       amiodarone 200 MG tablet    PACERONE/CODARONE    90 tablet    Take 1 tablet (200 mg) by mouth daily       apixaban ANTICOAGULANT 2.5 MG tablet    ELIQUIS    180 tablet    Take 1 tablet (2.5 mg) by mouth 2 times daily       atenolol 25 MG tablet    TENORMIN    180 tablet    Take 1 tablet (25 mg) by mouth 2 times daily       levETIRAcetam 500 MG tablet    KEPPRA    180 tablet    Take 1 tablet (500 mg) by mouth 2 times daily       lisinopril 10 MG tablet    PRINIVIL/ZESTRIL    90 tablet    Take 1 tablet (10 mg) by mouth daily       polyethylene glycol powder    MIRALAX    510 g    Take 17 g by mouth daily       simvastatin 20 MG tablet    ZOCOR    90 tablet    Take 1 tablet (20 mg) by mouth At Bedtime       vitamin D 1000 UNITS capsule      Reported on 4/11/2017

## 2017-05-18 NOTE — PROGRESS NOTES
Clinic Care Coordination Contact  OUTREACH    Referral Information:  Referral Source: PCP  Reason for Contact: follow up  Care Conference: No     Universal Utilization:   ED Visits in last year: 1  Hospital visits in last year: 1  Last PCP appointment: 05/15/17  Missed Appointments: 0     Clinical Concerns:  Current Medical Concerns: Pt seen in clinic on 5/12 for hypertension follow up.  Per discussion with pt she purchased a new blood pressure cuff as her old cuff was showing results that were low - 110-120/70-80's.   New cuff is reading higher - yesterdays BP was 148/88.  Pt notes when she had PT on Tuesday her BP was 163/89.   Pt requested clarification on her blood pressure medications and she thinks her atenolol dose was going to change.  Reviewed PCP note from 5/12/17 which notes pt will increase her lisinopril to 10 mg daily.   Atenolol prescription was unchanged.   Pt would like PCP to confirm that this was the plan.   Agreed to f/u with Dr. Leiva.  Pt has an appointment today at [place] to have her [employer] re-assessed today.   Pt notes no other concerns or needs at this time.  Pt continues to work with outpatient PT twice a week and SLP once a week.  Pt is driving and remains independent with her own care needs.  She has transportation to [place] as she only drives near her home.       Medication Management:  See note above.    Functional Status:  Mobility Status: Independent     Transportation: self - local only         Psychosocial:  Current living arrangement:: Not Assessed, I live alone  Financial/Insurance: No concerns     Resources and Interventions:  Current Resources: Other (Comments) (Outpatient rehab - SLP and PT pt prefers [facility]);          Advanced Care Plans/Directives on file:: Yes  Referrals Placed: Community Resources, Financial Services, Home Care, Housekeeping or Chore Agency, Meals on Wheels, Transportation (Emailed information to patient fidencioprosper on 3/29)     Frequency of  Care Coordination: PRN  Upcoming appointment: 04/11/17     Plan:   Pt will follow current medication plan until she hears back from the clinic.     RN CC will route encounter to PCP and f/u with patient as needed.    Nataly Murcia RN BSN, PHN RN Care Coordinator  [facility]  [email or web address]  [phone]  5/18/2017 11:30 AM

## 2017-05-18 NOTE — PROGRESS NOTES
Clinic Care Coordination Contact  Care Team Conversations    VM left for patient with updated response from PCP. Will f/u with pt on Monday 5/22/17.    Nataly Murcia, RN BSN, PHN RN Care Coordinator  [facility]  [email or web address]  [phone]  5/18/2017 12:30 PM

## 2017-05-19 NOTE — LETTER
[facility]  [address] [place] [address]  [place] [address]  [phone]      May 23, 2017      Navid Estrella  [address] [place] [id number]        Amy Shoemaker:    Patient was in [place] visiting her daughter when she had a stroke on 2/11/17. She was in ICU on 2/19/17 and unable to fly back to [place] on her scheduled flight that day. Please reimburse her for the the cost of that return flight.           Sincerely,      TATE WILKS M.D.

## 2017-05-19 NOTE — TELEPHONE ENCOUNTER
Reason for Call:  Other Note/letter    Detailed comments: Patient daughter calling and asking for a note/letter stating that patient had a stroke 02/11/17 and was in the hospital. Patient missed her flight back home on 02/19/17. Daughter trying to get reimbursed for the plane ticket    Phone Number Patient can be reached at: Other phone number:  [phone]    Best Time: any time    Can we leave a detailed message on this number? YES    Call taken on 5/19/2017 at 1:27 PM by Maren Bruno

## 2017-05-22 NOTE — TELEPHONE ENCOUNTER
Note says that stroke was 2-14-17 (not 2-)  Unclear if patient was in [place] at time of stroke and missed her flight back to [place] or if daughter was caring for patient and missed a flight somewhere  Pamela Bonilla MD

## 2017-05-22 NOTE — TELEPHONE ENCOUNTER
Dr. Leiva out of office-- we do not have a hospital discharge summary for patient's stroke  Please get full details from pts daughter on date of stroke and hospitalization, then I can write a letter        Pamela Bonilla MD

## 2017-05-23 NOTE — PROGRESS NOTES
Clinic Care Coordination Contact  Care Team Conversations    CC outreached to patient to confirm she had received VM and to address any additional questions/concerns that patient might have.   Pt stated she had received VM and voiced no concerns.  Agreed to f/u with patient in 3-4 weeks.  Pt has CC contact information should she have any questions or needs.    Nataly Murcia RN BSN, PHN RN Care Coordinator  [facility]  [email or web address]  [phone]  5/23/2017 8:22 AM

## 2017-05-23 NOTE — TELEPHONE ENCOUNTER
Letter signed by Dr. Leiva and emailed to daughter at [email or web address].  Copy also mailed to daughter, Kathya Yung, [id number] [facility], [place]  [address].  Melania Vizcaino,

## 2017-06-01 NOTE — TELEPHONE ENCOUNTER
Daughter, Kathya called and stated she has not received the letter by email or mail.  Verified address and it should be [address] [address].  Letter mailed to this address.  Also she requested letter be faxed to her  Jermaine and this was done to fax number [phone].  Melania Vizcaino,

## 2017-06-07 NOTE — TELEPHONE ENCOUNTER
DC'd from [facility] on 6/6 to a SNF  Primary Problem: Primary Localized osteoarthrosis, lower leg, unspecified laterality  LOS: 4.9  Readmit Risk: High

## 2017-06-07 NOTE — LETTER
June 7, 2017      TO: Navid Estrella  [address] [place] [phone]         Dear Navid,    This letter is in reference to your upcoming heart device appointment with the cardiology nurse scheduled for 8/15/17 at the [facility].    Unfortunately, the Cardiology Nurse will be out of the office/clinic on 8/15/17.  We have rescheduled your appointment to Wednesday, 08/16/17 at 2:30 PM  at the [facility]  ([address] [place] [address]); please see the enclosed appointment information sheet for details.      If this appointment conflicts with your schedule, please feel free to call and reschedule at your convenience; you may reach us at [phone].      We are sorry for any inconvenience this may cause.  We look forward to seeing you soon.        Best Regards,    Your Cardiology Team  [facility] @ [facility]

## 2017-06-07 NOTE — PROGRESS NOTES
Appointment letter and information sheet mailed to patient with appointment change.  CRYS Casiano.

## 2017-06-08 NOTE — PROGRESS NOTES
Clinic Care Coordination Contact  Care Team Conversations    Received notification that patient discharged from [facility] on 6/6 with primary dx of OA.  Pt discharged to a community SNF however facility was not identified. Pt was scheduled for CC f/u on 6/19/17.   Will review chart in 3-5 business days.        Nataly Murcia, RN BSN, PHN RN Care Coordinator  [facility]  [email or web address]  [phone]  6/8/2017 4:10 PM

## 2017-06-13 NOTE — PROGRESS NOTES
Clinic Care Coordination Contact  [facility]/Voicemail    Referral Source: PCP  Clinical Data: Care Coordinator Outreach: pt discharged from [facility] on 6/6 to a community SNF however facility was not identified.  No additional documentation noted in patients chart.   left for patient.  Outreach attempted x 1.  Left message on voicemail with call back information and requested return call.  Plan: Care Coordinator will try to reach patient again in 3-5 business days.      Nataly Murcia RN BSN, PHN RN Care Coordinator  [facility]  [email or web address]  [phone]  6/13/2017 5:14 PM

## 2017-06-19 NOTE — PROGRESS NOTES
Clinic Care Coordination Contact  Care Team Conversations    Received notification on 6/8 indicating that pt was discharged from [facility] on 6/6/17 and discharged to a community SNF. Per chart review pt was hospitalized for a femur fx and is s/p ORIF left femur.  SNF was not identified.  CC mailed a letter to patients home on 6/13/17 and a VM was also left on patients home #.  Will review again in 7-10 days.    Nataly Murcia RN BSN, PHN RN Care Coordinator  [facility]  [email or web address]  [phone]  6/19/2017 2:14 PM

## 2017-06-21 NOTE — PROGRESS NOTES
SUBJECTIVE:  Navid Estrella is a 87 year old year old female who is here today for follow up ORIF of her closed fracture of left distal femur fracture on 5/24/17.     It has been approximately 1 month since the procedure.     She has was discharged from [facility] on 6/6/17 and discharged to a community SNF.    Present symptoms: pain in groin the past few days.   Developed sore on the back of her calf, probably from brace.   She has been compliant with weight-bearing restrictions.   No fevers or chills.     Patient presents to the clinic wearing knee immobilizer and in a wheel chair.     OBJECTIVE:   /75 (BP Location: Left arm, Patient Position: Chair, Cuff Size: Adult Regular)  Pulse 102  Resp 16  SpO2 96%   Patient appears to be alert and in no apparent distress distress  Inspection: 10 by 5 mm superficial ulcer on the distal third of the calf which is near the end of the knee immobilizer.   Pulses difficult to palpate.  Similar to preop and postoperative.  We were able to doppler a posterior tibial pulse postoperatively.  Wounds look good. No evidence of infection. No warmth. Some swelling.   Moderate effusion   ROM: 0-60 degrees of the knee  Tender over proximal third of the femur, and very tender over anterior hip area   Inner thigh bruising seen.  Hip ROM is well tolerated    X-rays: Obtained today of the LEFT FEMUR: 2-views, reviewed in the office with the patient by myself today and show some mild OA of the hip, the hardware seems to be in good position, the fracture seems to be in good position. No new fracture above the plate.    ASSESSMENT:   1. Left distal femur fracture--doing well   2. Superificial ulcer of the left calf due to the knee immobilizer    PLAN:   Discussed with the patient and patient's daughter that the hip pain may be due to muscle irritation or local hematoma. There is no evidence of infection that I can see.   Discontinue knee immobilizer mainly due to skin ulcer.    Rehab:  Continue physical therapy. Okay to do SLR without immobilizer, work on ROM of the knee, advance to 30% weight bearing. Wound care nurse for ulcer.     Return to clinic in 3 weeks. New x-rays of the left femur at that time.     CONSTANZA Rosa MD  Dept. Orthopedic Surgery  [facility]    This document serves as a record of the services and decisions personally performed and made by Dr. CONSTANZA Rosa MD. It was created on his behalf by Fred Faith, a trained medical scribe. The creation of this record is based on the provider's personal observations and the statements of the patient. This document has been checked and approved by the attending provider.   Fred Faith June 21, 2017 4:00 PM

## 2017-06-21 NOTE — NURSING NOTE
"Chief Complaint   Patient presents with     Surgical Followup     Left femus fracture. Patient reports pain in left hip and radiates down to leg. Open wound located on back of left leg.        Initial /75 (BP Location: Left arm, Patient Position: Chair, Cuff Size: Adult Regular)  Pulse 102  Resp 16  SpO2 96% Estimated body mass index is 17.01 kg/(m^2) as calculated from the following:    Height as of 5/12/17: 1.651 m (5' 5\").    Weight as of 5/12/17: 46.4 kg (102 lb 3.2 oz).  Medication Reconciliation: complete   Annette Powell CMA      "

## 2017-06-21 NOTE — MR AVS SNAPSHOT
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, subsequent encounter    -  1    Surgical aftercare, musculoskeletal system           Follow-ups after your visit        Your next 10 appointments already scheduled     Aug 16, 2017  2:30 PM CDT   Return Visit with DEVICE CHECK RN CARD   [facility] ([facility])    [address] [place] [address] 2nd Floor  [facility] [address] [phone]              Who to contact     If you have questions or need follow up information about today's clinic visit or your schedule please contact [facility] directly at [phone].  Normal or non-critical lab and imaging results will be communicated to you by MyChart, letter or phone within 4 business days after the clinic has received the results. If you do not hear from us within 7 days, please contact the clinic through Enviviohart or phone. If you have a critical or abnormal lab result, we will notify you by phone as soon as possible.  Submit refill requests through [employer]Spark or call your pharmacy and they will forward the refill request to us. Please allow 3 business days for your refill to be completed.          Additional Information About Your Visit        MyChart Information     [employer] gives you secure access to your electronic health record. If you see a primary care provider, you can also send messages to your care team and make appointments. If you have questions, please call your primary care clinic.  If you do not have a primary care provider, please call [phone] and they will assist you.        Care EveryWhere ID     This is your Care  EveryWhere ID. This could be used by other organizations to access your [place] medical records  [id number]        Your Vitals Were     Pulse Respirations Pulse Oximetry             102 16 96%          Blood Pressure from Last 3 Encounters:   06/21/17 133/75   05/12/17 130/80   04/12/17 158/83    Weight from Last 3 Encounters:   05/12/17 46.4 kg (102 lb 3.2 oz)   04/11/17 47.2 kg (104 lb)   01/09/17 49 kg (108 lb)               Primary Care Provider Office Phone # Fax #    Bess Leiva [phone]       [facility] [address] [facility] [address]        Equal Access to Services     SAKINA BURROWS : Hadii aad ku hadasho Soomaali, waaxda luqadaha, qaybta kaalmada adeegyada, les nelson. So [facility] [phone].    ATENCIÓN: Si habla español, tiene a cruz disposición servicios gratuitos de asistencia lingüística. Llame al [phone].    We comply with applicable federal civil rights laws and [place] laws. We do not discriminate on the basis of race, color, national origin, age, disability sex, sexual orientation or gender identity.            Thank you!     Thank you for choosing [facility]  for your care. Our goal is always to provide you with excellent care. Hearing back from our patients is one way we can continue to improve our services. Please take a few minutes to complete the written survey that you may receive in the mail after your visit with us. Thank you!             Your Updated Medication List - Protect others around you: Learn how to safely use, store and throw away your medicines at www.disposemymeds.org.          This list is accurate as of: 6/21/17  6:44 PM.  Always use your most recent med list.                   Brand Name Dispense Instructions for use Diagnosis    alendronate 70 MG tablet    FOSAMAX    12 tablet    Take 1 tablet (70 mg) by mouth every 7 days Take with over 8 ounces water and stay upright  for at least 30 minutes after dose.  Take at least 60 minutes before breakfast    Osteoporosis       amiodarone 200 MG tablet    PACERONE/CODARONE    90 tablet    Take 1 tablet (200 mg) by mouth daily    Paroxysmal atrial fibrillation (H), Sick sinus syndrome (H)       apixaban ANTICOAGULANT 2.5 MG tablet    ELIQUIS    180 tablet    Take 1 tablet (2.5 mg) by mouth 2 times daily    Paroxysmal atrial fibrillation (H)       atenolol 25 MG tablet    TENORMIN    180 tablet    Take 1 tablet (25 mg) by mouth 2 times daily    Benign essential hypertension       levETIRAcetam 500 MG tablet    KEPPRA    180 tablet    Take 1 tablet (500 mg) by mouth 2 times daily    Cerebrovascular accident (CVA), unspecified mechanism (H)       lisinopril 10 MG tablet    PRINIVIL/ZESTRIL    90 tablet    Take 1 tablet (10 mg) by mouth daily    Benign essential hypertension       polyethylene glycol powder    MIRALAX    510 g    Take 17 g by mouth daily    Constipation       simvastatin 20 MG tablet    ZOCOR    90 tablet    Take 1 tablet (20 mg) by mouth At Bedtime    Hyperlipidemia LDL goal <130       vitamin D 1000 UNITS capsule      Reported on 4/11/2017

## 2017-06-21 NOTE — LETTER
6/21/2017       RE: Navid Estrella  [address] [facility] [address]           Dear Colleague,    Thank you for referring your patient, Navid Estrella, to the [facility]. Please see a copy of my visit note below.    SUBJECTIVE:  Navid Estrella is a 87 year old year old female who is here today for follow up ORIF of her closed fracture of left distal femur fracture on 5/24/17.     It has been approximately 1 month since the procedure.     She has was discharged from [facility] on 6/6/17 and discharged to a community SNF.    Present symptoms: pain in groin the past few days.   Developed sore on the back of her calf, probably from brace.   She has been compliant with weight-bearing restrictions.   No fevers or chills.     Patient presents to the clinic wearing knee immobilizer and in a wheel chair.     OBJECTIVE:   /75 (BP Location: Left arm, Patient Position: Chair, Cuff Size: Adult Regular)  Pulse 102  Resp 16  SpO2 96%   Patient appears to be alert and in no apparent distress distress  Inspection: 10 by 5 mm superficial ulcer on the distal third of the calf which is near the end of the knee immobilizer.   Pulses difficult to palpate.  Similar to preop and postoperative.  We were able to doppler a posterior tibial pulse postoperatively.  Wounds look good. No evidence of infection. No warmth. Some swelling.   Moderate effusion   ROM: 0-60 degrees of the knee  Tender over proximal third of the femur, and very tender over anterior hip area   Inner thigh bruising seen.  Hip ROM is well tolerated    X-rays: Obtained today of the LEFT FEMUR: 2-views, reviewed in the office with the patient by myself today and show some mild OA of the hip, the hardware seems to be in good position, the fracture seems to be in good position. No new fracture above the plate.    ASSESSMENT:   1. Left distal femur fracture--doing well   2. Superificial ulcer of the left calf due to the knee immobilizer    PLAN:    Discussed with the patient and patient's daughter that the hip pain may be due to muscle irritation or local hematoma. There is no evidence of infection that I can see.   Discontinue knee immobilizer mainly due to skin ulcer.    Rehab: Continue physical therapy. Okay to do SLR without immobilizer, work on ROM of the knee, advance to 30% weight bearing. Wound care nurse for ulcer.     Return to clinic in 3 weeks. New x-rays of the left femur at that time.     CONSTANZA Rosa MD  Dept. Orthopedic Surgery  [facility]    This document serves as a record of the services and decisions personally performed and made by Dr. CONSTANZA Rosa MD. It was created on his behalf by Fred Faith, a trained medical scribe. The creation of this record is based on the provider's personal observations and the statements of the patient. This document has been checked and approved by the attending provider.   Fred Faith June 21, 2017 4:00 PM    Again, thank you for allowing me to participate in the care of your patient.        Sincerely,              Carlos Rosa MD

## 2017-06-30 NOTE — PROGRESS NOTES
Clinic Care Coordination Contact  [facility]/Voicemail    Referral Source: IP/TCU Report  Clinical Data: Care Coordinator Outreach: Per CC noted from 6/8 patient discharged to a community SNF on 6/6.  Community SNF is not identified.  No d/c summary noted in patients chart.  Pt had f/u with orthopedic surgery on 6/21 where it was noted that pt sustained a left femur fracture with a wound on the back of her left leg.  Wound appears to be from knee immobilizer.  Pt is to f/u with ortho provider in 3 weeks.   No additional information concerning SNF or home care noted.    Outreach attempted x 2.  Left message on voicemail with call back information and requested return call.  Plan: Care Coordinator will try to reach patient again in 7-10 business days.      Nataly Murcia RN BSN, PHN RN Care Coordinator  [facility] [place] [facility] [place]  [email or web address]  [phone]  6/30/2017 10:43 AM

## 2017-07-07 NOTE — PROGRESS NOTES
Clinic Care Coordination Contact  Care Team Conversations    Chart reviewed.  At this time it is unknown which SNF patient was discharged to.  CC has left two voicemail messages and mailed letter to patient with no response.  Last outreach was on 6/30.  If no response by 7/13 will close to CC.    Nataly Murcia, RN BSN, PHN RN Care Coordinator  [facility]  [email or web address]  [phone]  7/7/2017 11:41 AM

## 2017-07-12 NOTE — PROGRESS NOTES
SUBJECTIVE:  Navid Estrella is a 87 year old year old female who is here today for follow up periprosthetic ORIF of her closed fracture of left distal femur fracture on 5/24/17. Reports of having more pain last week.     It has been approximately 7 weeks since the procedure.     She has been compliant with weight-bearing restrictions.   No fevers or chills.     OBJECTIVE:   /60 (BP Location: Left arm, Patient Position: Chair, Cuff Size: Adult Regular)  Pulse 88  Resp 16  Wt 44.3 kg (97 lb 9.6 oz)  BMI 16.24 kg/m2   Patient appears to be alert and in no apparent distress distress  Inspection: Small superficial ulcer still on the posterior calf. It seems to be improving. It is triangular in shape.   Tender: inner thigh  Log rolling the femur does not cause any pain  Rotation of the hip is pain free   Difficulty wit hip flexion  Knee ROM: 0-90 degrees   Ligaments: Stable   She has about a 20 degree extensor lag    ASSESSMENT:   1. Healing periprosthetic left distal femur fracture  2. Superificial ulcer of the left calf     PLAN: I have explained that this is a very severe fracture and that the healing process will be quite prolonged, particularly at her age and along with her poor nutritional status, and with the restrictions she has been limited to that have led to muscle atrophy, her recovery to full function will be slow.     Full weight bearing as tolerated on the left side with walker for ambulatory assist and wearing the knee immobilizer with superficial ulcer padded for ambulation.   We again discussed the importance of maintaining a good diet.   Rehab: Continue physical therapy. Wound care.    Return to clinic in 4 weeks.  New femur xrays then.     CONSTANZA Rosa MD  Dept. Orthopedic Surgery  [facility]    This document serves as a record of the services and decisions personally performed and made by Dr. CONSTANZA Rosa MD. It was created on his behalf by Fred Faith, a trained medical  janey. The creation of this record is based on the provider's personal observations and the statements of the patient. This document has been checked and approved by the attending provider.   Fred Faith July 18, 2017 12:16 PM

## 2017-07-12 NOTE — TELEPHONE ENCOUNTER
Reason for Call:  Faxing over hip xray    Detailed comments: Caller would like to discuss the xray she faxed over for the left hip that does show a fracture. Please call.    Phone Number Patient can be reached at: Other phone number:  [phone]     Best Time: any    Can we leave a detailed message on this number? Not Applicable    Call taken on 7/12/2017 at 1:47 PM by Sofia Ross

## 2017-07-12 NOTE — TELEPHONE ENCOUNTER
This in house SNF xray report (only) was given to J[facility] for review regarding L LE fracture with history of previous ORIF/procedure. @ [facility]

## 2017-07-18 NOTE — NURSING NOTE
"Chief Complaint   Patient presents with     RECHECK     Left femur follow up. DOS: 5/24/2017.        Initial /60 (BP Location: Left arm, Patient Position: Chair, Cuff Size: Adult Regular)  Pulse 88  Resp 16  Wt 44.3 kg (97 lb 9.6 oz)  BMI 16.24 kg/m2 Estimated body mass index is 16.24 kg/(m^2) as calculated from the following:    Height as of 5/12/17: 1.651 m (5' 5\").    Weight as of this encounter: 44.3 kg (97 lb 9.6 oz).  Medication Reconciliation: complete   Annette Powell CMA      "

## 2017-07-18 NOTE — LETTER
7/18/2017         RE: Navid Estrella  [address] [facility] [address]        Dear Colleague,    Thank you for referring your patient, Navid Estrella, to the [facility]. Please see a copy of my visit note below.    SUBJECTIVE:  Navid Estrella is a 87 year old year old female who is here today for follow up periprosthetic ORIF of her closed fracture of left distal femur fracture on 5/24/17. Reports of having more pain last week.     It has been approximately 7 weeks since the procedure.     She has been compliant with weight-bearing restrictions.   No fevers or chills.     OBJECTIVE:   /60 (BP Location: Left arm, Patient Position: Chair, Cuff Size: Adult Regular)  Pulse 88  Resp 16  Wt 44.3 kg (97 lb 9.6 oz)  BMI 16.24 kg/m2   Patient appears to be alert and in no apparent distress distress  Inspection: Small superficial ulcer still on the posterior calf. It seems to be improving. It is triangular in shape.   Tender: inner thigh  Log rolling the femur does not cause any pain  Rotation of the hip is pain free   Difficulty wit hip flexion  Knee ROM: 0-90 degrees   Ligaments: Stable   She has about a 20 degree extensor lag    ASSESSMENT:   1. Healing periprosthetic left distal femur fracture  2. Superificial ulcer of the left calf     PLAN: I have explained that this is a very severe fracture and that the healing process will be quite prolonged, particularly at her age and along with her poor nutritional status, and with the restrictions she has been limited to that have led to muscle atrophy, her recovery to full function will be slow.     Full weight bearing as tolerated on the left side with walker for ambulatory assist and wearing the knee immobilizer with superficial ulcer padded for ambulation.   We again discussed the importance of maintaining a good diet.   Rehab: Continue physical therapy. Wound care.    Return to clinic in 4 weeks.  New femur xrays then.     CONSTANZA Rosa  MD  Dept. Orthopedic Surgery  [facility]    This document serves as a record of the services and decisions personally performed and made by Dr. CONSTANZA Rosa MD. It was created on his behalf by Fred Faith, a trained medical scribe. The creation of this record is based on the provider's personal observations and the statements of the patient. This document has been checked and approved by the attending provider.   Fred Faith July 18, 2017 12:16 PM    Again, thank you for allowing me to participate in the care of your patient.        Sincerely,        Carlos Rosa MD

## 2017-07-18 NOTE — PATIENT INSTRUCTIONS
Please remember to call and schedule a follow up appointment if one was recommended at your earliest convenience.   Orthopedics CLINIC HOURS TELEPHONE NUMBER   Carlos Rosa M.D.      Marjorie Galindo,  LPN Tuesday 8 am -5 pm    1st & 3rd Wednesday  1-4pm [place] 2nd & 4th Wednesday  8 am -11 pm / [place]  1-4pm / [place] Thursday 8 am -5 pm   Specialty schedulers:   [phone] to make an appointment with any Specialty Provider.   Main Clinic:   [phone] to make an appointment with your primary provider   Urgent Care locations:    [facility] Monday-Friday 5 pm - 9 pm  Saturday-Sunday 9 am -5pm      Monday-Friday 11 am - 9 pm  Saturday 9 am - 5 pm [phone]     If SURGERY has been recommended, please call our Specialty Schedulers at [phone] to schedule your procedure.    If you need a medication refill, please contact your pharmacy. Please allow 3 business days for your refill to be completed.  Use [employer] (secure e-mail communication and access to your chart) to send a message or to make an appointment. Please ask how you can sign up for [employer].

## 2017-07-18 NOTE — PROGRESS NOTES
SUBJECTIVE:                                                    Navid Estrella is a 87 year old female who presents to clinic today for the following health issues:          Hospital Follow-up Visit:    Hospital/Nursing Home/IP Rehab Facility: [facility]  Date of Admission: 6/1/17  Date of Discharge: ?  Reason(s) for Admission: Fracrure            Problems taking medications regularly:  None       Medication changes since discharge: None       Problems adhering to non-medication therapy:  None    Summary of hospitalization:  CareEverywhere information obtained and reviewed  Diagnostic Tests/Treatments reviewed.  Follow up needed: none  Other Healthcare Providers Involved in Patient s Care:         None  Update since discharge: improved.     Post Discharge Medication Reconciliation: discharge medications reconciled and changed, per note/orders (see AVS).  Plan of care communicated with patient and family     Coding guidelines for this visit:  Type of Medical   Decision Making Face-to-Face Visit       within 7 Days of discharge Face-to-Face Visit        within 14 days of discharge   Moderate Complexity 60336 [id number]   High Complexity 67806 [id number]                   Problem list and histories reviewed & adjusted, as indicated.  Additional history: as documented    Patient Active Problem List   Diagnosis     OA (osteoarthritis)     HYPERLIPIDEMIA LDL GOAL <130     Status post total knee replacement     Advanced directives, counseling/discussion     HL (hearing loss)     Pseuodophakia OU     Osteoporosis     Keratoacanthoma of cheek     Constipation     Impacted cerumen     History of skin cancer     Actinic keratosis     PVD (posterior vitreous detachment), left     Closed displaced fracture of phalanx of lesser toe of right foot, unspecified phalanx, initial encounter     Benign essential hypertension     Cerebrovascular accident (CVA), unspecified mechanism (H)     Cardiac pacemaker in situ- Biotronik, dual chamber- NOT  dependent     A-fib (H)     Sick sinus syndrome (H)     Past Surgical History:   Procedure Laterality Date     APPENDECTOMY       ARTHROSCOPY KNEE RT/LT  1998    right     ARTHROSCOPY KNEE RT/LT  9/03    left     BREAST LUMPECTOMY, RT/LT  9/01    right breast in situ for carcinoma     C IMPLANT COCHLEAR DEVICE  3/2016    right     C TOTAL KNEE ARTHROPLASTY  4/11/11    left     CATARACT IOL, RT/LT  7/08    right     CATARACT IOL, RT/LT  8/08    left     COLONOSCOPY  3/19/2009     ENT SURGERY  5-23-13    Left ear canal biopsy     HC ERCP W  BIOPSY, SINGLE/MULTIPLE  6/10    possible pancreatic mass     HYSTERECTOMY, VAGINAL  3/01     JOINT REPLACEMTN, KNEE RT/LT  2/2010    RT     LAPAROSCOPIC TRANSGASTRIC ENDOSCOPIC RETROGRADE CHOLANGIOPANCREATOGRAPHY (ERCP)  7/11    follow up on possible pancreatic mass     LAPAROSCOPIC TRANSGASTRIC ENDOSCOPIC RETROGRADE CHOLANGIOPANCREATOGRAPHY (ERCP)  7/12    possible pancreatic mass biopsy     ROTATOR CUFF REPAIR RT/LT  1/07    left     TONSILLECTOMY & ADENOIDECTOMY         Social History   Substance Use Topics     Smoking status: Former Smoker     Quit date: 8/1/1991     Smokeless tobacco: Never Used      Comment: quit 20 years ago     Alcohol use 0.0 oz/week     0 Standard drinks or equivalent per week      Comment: Occassionally     Family History   Problem Relation Age of Onset     Hypertension Mother      CEREBROVASCULAR DISEASE Mother      CANCER Father      pancreatic Cancer Age 76     Hypertension Sister      CEREBROVASCULAR DISEASE Sister      HEART DISEASE Sister      Hypertension Sister      LUNG DISEASE Sister          Current Outpatient Prescriptions   Medication Sig Dispense Refill     ACETAMINOPHEN PO Take 1,000 mg by mouth every 4 hours as needed for pain       Lactobacillus Rhamnosus, GG, (CULTURELLE PO) Take by mouth 3 times daily       ferrous sulfate (IRON) 325 (65 FE) MG tablet Take 325 mg by mouth daily (with breakfast)       Cephalexin (KEFLEX PO) Take 500 mg  by mouth 3 times daily       OXYCODONE HCL PO Take 5 mg by mouth every 4 hours as needed       sennosides (SENOKOT) 8.6 MG tablet Take 1 tablet by mouth daily       HydrOXYzine Pamoate (VISTARIL PO) Take 25 mg by mouth every 4 hours as needed for itching       Ondansetron HCl (ZOFRAN PO) Take 4 mg by mouth every 6 hours as needed for nausea or vomiting       magnesium hydroxide (MILK OF MAGNESIA) 400 MG/5ML suspension Take 30-60 mLs by mouth daily as needed for constipation or heartburn 360 mL 1     lisinopril (PRINIVIL/ZESTRIL) 10 MG tablet Take 1 tablet (10 mg) by mouth daily 90 tablet 3     amiodarone (PACERONE/CODARONE) 200 MG tablet Take 1 tablet (200 mg) by mouth daily 90 tablet 1     alendronate (FOSAMAX) 70 MG tablet Take 1 tablet (70 mg) by mouth every 7 days Take with over 8 ounces water and stay upright for at least 30 minutes after dose.  Take at least 60 minutes before breakfast 12 tablet 3     apixaban ANTICOAGULANT (ELIQUIS) 2.5 MG tablet Take 1 tablet (2.5 mg) by mouth 2 times daily 180 tablet 3     levETIRAcetam (KEPPRA) 500 MG tablet Take 1 tablet (500 mg) by mouth 2 times daily 180 tablet 3     simvastatin (ZOCOR) 20 MG tablet Take 1 tablet (20 mg) by mouth At Bedtime 90 tablet 3     polyethylene glycol (MIRALAX) powder Take 17 g by mouth daily 510 g 1     VITAMIN D 1000 UNIT OR CAPS Reported on 4/11/2017       atenolol (TENORMIN) 25 MG tablet Take 1 tablet (25 mg) by mouth 2 times daily 180 tablet 4     Allergies   Allergen Reactions     Morphine Nausea     BP Readings from Last 3 Encounters:   07/20/17 122/64   07/18/17 120/60   06/21/17 133/75    Wt Readings from Last 3 Encounters:   07/20/17 96 lb (43.5 kg)   07/18/17 97 lb 9.6 oz (44.3 kg)   05/12/17 102 lb 3.2 oz (46.4 kg)                  Labs reviewed in EPIC        Reviewed and updated as needed this visit by clinical staff       Reviewed and updated as needed this visit by Provider         ROS:  This 87 year old female is here today with  "her daughter who is here from [place]. Patient fell getting out of her car 6/1/17 by [employer] someone saw her and called 911. She fractured her distal left femur. She had ORIF at [facility] by Dr. Rosa on 6/2/17 and then transitioned to [facility] in [place] for rehab. She is still having a lot of pain and is on oxycodone 5 mg every 6 hours. She is also on iron pills for anemia. She is extremely constipated to the point of not eating any more and vomiting a few times each day. She is miserable. Hard for her to participate in physical therapy. Daughter is concerned that rehab will not be covered by Medicare much longer. Patient hopes to be discharged to live with her twin sister and have home therapy there. She is even getting pain in her back at times now, due to inactivity.  She does drink Boost and Ensure during the day as supplements.  All other review of systems are negative  Personal, family, and social history reviewed with patient and revised.         OBJECTIVE:     /64 (BP Location: Left arm, Patient Position: Chair, Cuff Size: Adult Regular)  Pulse 78  Temp 97  F (36.1  C)  Ht 5' 5\" (1.651 m)  Wt 96 lb (43.5 kg)  SpO2 98%  BMI 15.98 kg/m2  Body mass index is 15.98 kg/(m^2).  GENERAL:  alert and in wheelchair. Very weak and pale  Holding a basin in front of her in case she were to vomit  NECK: no adenopathy, no asymmetry, masses, or scars and thyroid normal to palpation  RESP: lungs clear to auscultation - no rales, rhonchi or wheezes  CV: regular rate and rhythm, normal S1 S2, no S3 or S4, no murmur, click or rub, no peripheral edema and peripheral pulses strong  ABDOMEN: soft, not distended  MS: no gross musculoskeletal defects noted, no edema  Would on left lower femur area is looking well healed  She has a healing abrasion over her left posterior calf due to friction from a brace that rubbed on her skin  She has become very thin.   Diagnostic Test Results:  none     ASSESSMENT/PLAN:      "         1. Closed displaced fracture of distal epiphysis of left femur, sequela  As above, she is taking too many narcotics for such a small person. She needs to taper off narcotics. I warned her to be prepared to experience increase pain until she is off the narcotics     2. Loss of weight  She needs to get off the narcotics so her nausea resolves and she can eat   - Comprehensive metabolic panel    3. Other constipation  This is due to the narcotics. She needs to taper off. She may only have one every 6-8 hours and then taper down   - magnesium hydroxide (MILK OF MAGNESIA) 400 MG/5ML suspension; Take 30-60 mLs by mouth daily as needed for constipation or heartburn  Dispense: 360 mL; Refill: 1    4. Anemia, unspecified type  As above, she may be able to get off iron pills if she is no longer anemic. That would help her constipation.   - CBC with platelets differential  - Comprehensive metabolic panel    Return to clinic if no improvement     TATE WILKS MD  [facility]

## 2017-07-18 NOTE — MR AVS SNAPSHOT
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   Orthopedics CLINIC HOURS TELEPHONE NUMBER   Carlos Rosa M.D.      Marjorie Mateo,  LPN Tuesday 8 am -5 pm    1st & 3rd Wednesday  1-4pm [place] 2nd & 4th Wednesday  8 am -11 pm / [place]  1-4pm / [place] Thursday 8 am -5 pm   Specialty schedulers:   [phone] to make an appointment with any Specialty Provider.   Main Clinic:   [phone] to make an appointment with your primary provider   Urgent Care locations:    [facility] Monday-Friday 5 pm - 9 pm  Saturday-Sunday 9 am -5pm      Monday-Friday 11 am - 9 pm  Saturday 9 am - 5 pm [phone]     If SURGERY has been recommended, please call our Specialty Schedulers at [phone] to schedule your procedure.    If you need a medication refill, please contact your pharmacy. Please allow 3 business days for your refill to be completed.  Use [employer] (secure e-mail communication and access to your chart) to send a message or to make an appointment. Please ask how you can sign up for [employer].              Follow-ups after your visit        Your next 10 appointments already scheduled     Aug 16, 2017  2:30 PM CDT   Return Visit with DEVICE CHECK RN CARD   [facility]  [facility] ([facility])    [address]  [facility] [place] [address] [phone]              Who to contact     If you have questions or need follow up information about today's clinic visit or your schedule please contact [facility] directly at [phone].  Normal or non-critical lab and imaging results will be communicated to you by MyChart, letter or phone within 4 business days after the clinic has received the results. If you do not hear from us within 7 days, please contact the clinic through MyChart or phone. If you have a critical or abnormal lab result, we will notify you by phone as soon as possible.  Submit refill requests through [employer] or call your pharmacy and they will forward the refill request to us. Please allow 3 business days for your refill to be completed.          Additional Information About Your Visit        MyChart Information     [employer]oku gives you secure access to your electronic health record. If you see a primary care provider, you can also send messages to your care team and make appointments. If you have questions, please call your primary care clinic.  If you do not have a primary care provider, please call [phone] and they will assist you.        Care EveryWhere ID     This is your Care EveryWhere ID. This could be used by other organizations to access your [facility] medical records  [id number]        Your Vitals Were     Pulse Respirations BMI (Body Mass Index)             88 16 16.24 kg/m2          Blood Pressure from Last 3 Encounters:   07/20/17 122/64   07/18/17 120/60   06/21/17 133/75    Weight from Last 3 Encounters:   07/20/17 43.5 kg (96 lb)   07/18/17 44.3 kg (97 lb 9.6 oz)   05/12/17 46.4 kg (102 lb 3.2 oz)               Primary Care Provider Office Phone # Fax #    Bess Leiva [phone]       [facility] [address] [address]        Equal Access to Services      [facility]: Hadii aad ku hadcarolyno Sojennaali, waaxda luqadaha, qaybta kaalmada adeegtomaszda, les agustoin hayaaanselmo santosrichard cadet daysi . So [facility] [phone].    ATENCIÓN: Si habla mal, tiene a cruz disposición servicios gratuitos de asistencia lingüística. Llame al [phone].    We comply with applicable federal civil rights laws and [place] laws. We do not discriminate on the basis of race, color, national origin, age, disability sex, sexual orientation or gender identity.            Thank you!     Thank you for choosing [facility] [place]  for your care. Our goal is always to provide you with excellent care. Hearing back from our patients is one way we can continue to improve our services. Please take a few minutes to complete the written survey that you may receive in the mail after your visit with us. Thank you!             Your Updated Medication List - Protect others around you: Learn how to safely use, store and throw away your medicines at www.disposemymeds.org.          This list is accurate as of: 7/18/17 11:59 PM.  Always use your most recent med list.                   Brand Name Dispense Instructions for use Diagnosis    alendronate 70 MG tablet    FOSAMAX    12 tablet    Take 1 tablet (70 mg) by mouth every 7 days Take with over 8 ounces water and stay upright for at least 30 minutes after dose.  Take at least 60 minutes before breakfast    Osteoporosis       amiodarone 200 MG tablet    PACERONE/CODARONE    90 tablet    Take 1 tablet (200 mg) by mouth daily    Paroxysmal atrial fibrillation (H), Sick sinus syndrome (H)       apixaban ANTICOAGULANT 2.5 MG tablet    ELIQUIS    180 tablet    Take 1 tablet (2.5 mg) by mouth 2 times daily    Paroxysmal atrial fibrillation (H)       atenolol 25 MG tablet    TENORMIN    180 tablet    Take 1 tablet (25 mg) by mouth 2 times daily    Benign essential hypertension       levETIRAcetam 500 MG tablet    KEPPRA    180 tablet    Take 1 tablet (500 mg) by  mouth 2 times daily    Cerebrovascular accident (CVA), unspecified mechanism (H)       lisinopril 10 MG tablet    PRINIVIL/ZESTRIL    90 tablet    Take 1 tablet (10 mg) by mouth daily    Benign essential hypertension       polyethylene glycol powder    MIRALAX    510 g    Take 17 g by mouth daily    Constipation       simvastatin 20 MG tablet    ZOCOR    90 tablet    Take 1 tablet (20 mg) by mouth At Bedtime    Hyperlipidemia LDL goal <130       vitamin D 1000 UNITS capsule      Reported on 4/11/2017

## 2017-07-20 NOTE — LETTER
[facility]  [address] [place] [address] [place] [address]    July 21, 2017    Navid Estrella  [address] [place], [place] [address] [address]          Dear Navid,  All of your lab tests are looking good. Your hemoglobin is in normal range and you can stop your iron tablets. That should help your constipation.     Results for orders placed or performed in visit on 07/20/17   CBC with platelets differential   Result Value Ref Range    WBC 10.2 4.0 - 11.0 10e9/L    RBC Count 3.79 (L) 3.8 - 5.2 10e12/L    Hemoglobin 11.8 11.7 - 15.7 g/dL    Hematocrit 37.6 35.0 - 47.0 %    MCV 99 78 - 100 fl    MCH 31.1 26.5 - 33.0 pg    MCHC 31.4 (L) 31.5 - 36.5 g/dL    RDW 16.5 (H) 10.0 - 15.0 %    Platelet Count 381 150 - 450 10e9/L    Diff Method Automated Method     % Neutrophils 85.1 %    % Lymphocytes 3.7 %    % Monocytes 10.3 %    % Eosinophils 0.4 %    % Basophils 0.5 %    Absolute Neutrophil 8.6 (H) 1.6 - 8.3 10e9/L    Absolute Lymphocytes 0.4 (L) 0.8 - 5.3 10e9/L    Absolute Monocytes 1.1 0.0 - 1.3 10e9/L    Absolute Eosinophils 0.0 0.0 - 0.7 10e9/L    Absolute Basophils 0.1 0.0 - 0.2 10e9/L   Comprehensive metabolic panel   Result Value Ref Range    Sodium 139 133 - 144 mmol/L    Potassium 3.4 3.4 - 5.3 mmol/L    Chloride 103 94 - 109 mmol/L    Carbon Dioxide 28 20 - 32 mmol/L    Anion Gap 8 3 - 14 mmol/L    Glucose 133 (H) 70 - 99 mg/dL    Urea Nitrogen 9 7 - 30 mg/dL    Creatinine 0.55 0.52 - 1.04 mg/dL    GFR Estimate >90  Non  GFR Calc   >60 mL/min/1.7m2    GFR Estimate If Black >90   GFR Calc   >60 mL/min/1.7m2    Calcium 8.4 (L) 8.5 - 10.1 mg/dL    Bilirubin Total 0.8 0.2 - 1.3 mg/dL    Albumin 2.6 (L) 3.4 - 5.0 g/dL    Protein Total 5.8 (L) 6.8 - 8.8 g/dL    Alkaline Phosphatase 154 (H) 40 - 150 U/L    ALT 43 0 - 50 U/L    AST 51 (H) 0 - 45 U/L       If you have any questions or concerns, please me or my clinic team at [phone].      Sincerely,        Bess Leiva  MD/bt

## 2017-07-20 NOTE — MR AVS SNAPSHOT
After Visit Summary   7/20/2017    Navid Estrella    MRN: [id number]           Patient Information     Date Of Birth          5/14/1930        Visit Information        Provider Department      7/20/2017 9:00 AM Bess Leiva MD [facility]        Today's Diagnoses     Loss of weight    -  1    Other constipation        Anemia, unspecified type          Care Instructions    [facility]    If you have any questions regarding to your visit please contact your care team:       Team Purple:   Clinic Hours Telephone Number   Dr. Bess Bonilla     7am-7pm  Monday - Thursday   7am-5pm  Fridays  [phone]  (Appointment scheduling available 24/7)    Questions about your Visit?   Team Line:  [phone]   Urgent Care - [place] [facility] - 11am-9pm Monday-Friday Saturday-Sunday- 9am-5pm   [place] - 5pm-9pm Monday-Friday Saturday-Sunday- 9am-5pm  [phone] - [place]   [phone] - [place]       What options do I have for visits at the clinic other than the traditional office visit?  To expand how we care for you, many of our providers are utilizing electronic visits (e-visits) and telephone visits, when medically appropriate, for interactions with their patients rather than a visit in the clinic.   We also offer nurse visits for many medical concerns. Just like any other service, we will bill your insurance company for this type of visit based on time spent on the phone with your provider. Not all insurance companies cover these visits. Please check with your medical insurance if this type of visit is covered. You will be responsible for any charges that are not paid by your insurance.      E-visits via [employer]:  generally incur a $35.00 fee.  Telephone visits:  Time spent on the phone: *charged based on time that is spent on the phone in increments of 10 minutes. Estimated cost:   5-10 mins $30.00   11-20  mins. $59.00   21-30 mins. $85.00     Use Klatcher (secure email communication and access to your chart) to send your primary care provider a message or make an appointment. Ask someone on your Team how to sign up for Lendat.  For a Price Quote for your services, please call our Consumer Price Line at [phone].  As always, Thank you for trusting us with your health care needs!              Follow-ups after your visit        Your next 10 appointments already scheduled     Aug 16, 2017  2:30 PM CDT   Return Visit with DEVICE CHECK RN CARD   [facility] PHYSICIANS HEART AT [facility])    [address] [place] [address] Floor  [facility] [address] [phone]              Who to contact     If you have questions or need follow up information about today's clinic visit or your schedule please contact [facility] directly at [phone].  Normal or non-critical lab and imaging results will be communicated to you by [email or web address], letter or phone within 4 business days after the clinic has received the results. If you do not hear from us within 7 days, please contact the clinic through Lendat or phone. If you have a critical or abnormal lab result, we will notify you by phone as soon as possible.  Submit refill requests through Klatcher or call your pharmacy and they will forward the refill request to us. Please allow 3 business days for your refill to be completed.          Additional Information About Your Visit        [email or web address] Information     Klatcher gives you secure access to your electronic health record. If you see a primary care provider, you can also send messages to your care team and make appointments. If you have questions, please call your primary care clinic.  If you do not have a primary care provider, please call [phone] and they will assist you.        Care EveryWhere ID     This is your Care EveryWhere ID. This could be used by other organizations to  "access your [facility] medical records  [id number]        Your Vitals Were     Pulse Temperature Height Pulse Oximetry BMI (Body Mass Index)       78 97  F (36.1  C) 5' 5\" (1.651 m) 98% 15.98 kg/m2        Blood Pressure from Last 3 Encounters:   07/20/17 122/64   07/18/17 120/60   06/21/17 133/75    Weight from Last 3 Encounters:   07/20/17 96 lb (43.5 kg)   07/18/17 97 lb 9.6 oz (44.3 kg)   05/12/17 102 lb 3.2 oz (46.4 kg)              We Performed the Following     CBC with platelets differential     Comprehensive metabolic panel          Today's Medication Changes          These changes are accurate as of: 7/20/17  9:55 AM.  If you have any questions, ask your nurse or doctor.               Start taking these medicines.        Dose/Directions    magnesium hydroxide 400 MG/5ML suspension   Commonly known as:  MILK OF MAGNESIA   Used for:  Other constipation   Started by:  Bess Leiva MD        Dose:  30-60 mL   Take 30-60 mLs by mouth daily as needed for constipation or heartburn   Quantity:  360 mL   Refills:  1            Where to get your medicines      These medications were sent to [facility] [place] [address]     Phone:  [phone]     magnesium hydroxide 400 MG/5ML suspension                Primary Care Provider Office Phone # Fax #    Bess Leiva [phone]       [address] [address]        Equal Access to Services     [facility]: Hadii kobi shepherd hadasho Soomaali, waaxda luqadaha, qaybta kaalmada adeegyada, les nelson. So [facility] [phone].    ATENCIÓN: Si habla español, tiene a cruz disposición servicios gratuitos de asistencia lingüística. Llame al [phone].    We comply with applicable federal civil rights laws and [place] laws. We do not discriminate on the basis of race, color, national " origin, age, disability sex, sexual orientation or gender identity.            Thank you!     Thank you for choosing [facility]  for your care. Our goal is always to provide you with excellent care. Hearing back from our patients is one way we can continue to improve our services. Please take a few minutes to complete the written survey that you may receive in the mail after your visit with us. Thank you!             Your Updated Medication List - Protect others around you: Learn how to safely use, store and throw away your medicines at www.disposemymeds.org.          This list is accurate as of: 7/20/17  9:55 AM.  Always use your most recent med list.                   Brand Name Dispense Instructions for use Diagnosis    ACETAMINOPHEN PO      Take 1,000 mg by mouth every 4 hours as needed for pain        alendronate 70 MG tablet    FOSAMAX    12 tablet    Take 1 tablet (70 mg) by mouth every 7 days Take with over 8 ounces water and stay upright for at least 30 minutes after dose.  Take at least 60 minutes before breakfast    Osteoporosis       amiodarone 200 MG tablet    PACERONE/CODARONE    90 tablet    Take 1 tablet (200 mg) by mouth daily    Paroxysmal atrial fibrillation (H), Sick sinus syndrome (H)       apixaban ANTICOAGULANT 2.5 MG tablet    ELIQUIS    180 tablet    Take 1 tablet (2.5 mg) by mouth 2 times daily    Paroxysmal atrial fibrillation (H)       atenolol 25 MG tablet    TENORMIN    180 tablet    Take 1 tablet (25 mg) by mouth 2 times daily    Benign essential hypertension       CULTURELLE PO      Take by mouth 3 times daily        ferrous sulfate 325 (65 FE) MG tablet    IRON     Take 325 mg by mouth daily (with breakfast)        KEFLEX PO      Take 500 mg by mouth 3 times daily        levETIRAcetam 500 MG tablet    KEPPRA    180 tablet    Take 1 tablet (500 mg) by mouth 2 times daily    Cerebrovascular accident (CVA), unspecified mechanism (H)       lisinopril 10 MG tablet     PRINIVIL/ZESTRIL    90 tablet    Take 1 tablet (10 mg) by mouth daily    Benign essential hypertension       magnesium hydroxide 400 MG/5ML suspension    MILK OF MAGNESIA    360 mL    Take 30-60 mLs by mouth daily as needed for constipation or heartburn    Other constipation       OXYCODONE HCL PO      Take 5 mg by mouth every 4 hours as needed        polyethylene glycol powder    MIRALAX    510 g    Take 17 g by mouth daily    Constipation       sennosides 8.6 MG tablet    SENOKOT     Take 1 tablet by mouth daily        simvastatin 20 MG tablet    ZOCOR    90 tablet    Take 1 tablet (20 mg) by mouth At Bedtime    Hyperlipidemia LDL goal <130       VISTARIL PO      Take 25 mg by mouth every 4 hours as needed for itching        vitamin D 1000 UNITS capsule      Reported on 4/11/2017        ZOFRAN PO      Take 4 mg by mouth every 6 hours as needed for nausea or vomiting

## 2017-07-20 NOTE — NURSING NOTE
"Chief Complaint   Patient presents with     Weight Loss     Hospital F/U     Abdominal Pain       Initial /64 (BP Location: Left arm, Patient Position: Chair, Cuff Size: Adult Regular)  Pulse 78  Temp 97  F (36.1  C)  Ht 5' 5\" (1.651 m)  Wt 96 lb (43.5 kg)  SpO2 98%  BMI 15.98 kg/m2 Estimated body mass index is 15.98 kg/(m^2) as calculated from the following:    Height as of this encounter: 5' 5\" (1.651 m).    Weight as of this encounter: 96 lb (43.5 kg).  Medication Reconciliation: complete   Mitali Chavez MA      "

## 2017-07-20 NOTE — TELEPHONE ENCOUNTER
Left message for patient to return call to the RN hotline number at [phone].  Allyson Snider RN

## 2017-07-20 NOTE — TELEPHONE ENCOUNTER
Reason for Call: Request for an order or referral:    Order or referral being requested: Oxycodone order, please call to clarify the directions please call to discuss further    Date needed: [phone]    Has the patient been seen by the PCP for this problem? NO    Additional comments: please call today    Phone number Patient can be reached at:  Other phone number:  [phone]    Best Time:  today    Can we leave a detailed message on this number?  Not Applicable    Call taken on 7/20/2017 at 12:27 PM by Corby Murphy

## 2017-07-21 NOTE — TELEPHONE ENCOUNTER
Spoke with nurse they are needing clarification on Oxycodone order, they will need a tapered schedule.  Patient does see in house provider, they will ask this provider to give orders today for taper.   Ladonna Gallegos RN

## 2017-08-01 NOTE — LETTER
8/1/2017         RE: Navid Estrella  [address] [facility] [address]        Dear Colleague,    Thank you for referring your patient, Navid Estrella, to the [facility]. Please see a copy of my visit note below.    SUBJECTIVE:  Navid Estrella is a 87 year old year old female who is here today for follow up of ORIF of her periprosthetic left distal femur fracture on 5/24/17. She reports that her pain has decreased substantially from 2 weeks ago. Still in the rehab center.  Presents with her sister today.     It has been approximately 10 weeks since the procedure.  She is back early.    She has been compliant with weight-bearing restrictions.   No fevers or chills.     OBJECTIVE:   /88 (BP Location: Right arm, Patient Position: Chair, Cuff Size: Adult Regular)  Pulse 102  Resp 18  SpO2 90%   Patient appears to be alert and in no apparent distress distress  Inspection: surgical wounds healed, calf ulcer has healed, upper inner ecchymosis  Tender: inner thigh  Log rolling the femur does not cause any pain  Rotation of the hip is pain free   Knee ROM: near full extension to 100 degrees   Ligaments: Stable   Able to perform SLR without difficulty.    ASSESSMENT:   1. Healing periprosthetic left distal femur fracture  2. Superificial ulcer of the left calf--healed.     PLAN: I think patient is doing well, particularly compared to 2 weeks ago. For the next 2 weeks, she should continue to wear the knee immobilizer with ambulation only. Take off immobilizer when at rest. Once patient passes physical therapy, she can leave nursing home. Discharge to sister's home when patient passes her physical therapy. Hope for discharge in 2 weeks. Will need assistance with putting on and taking off the immobilizer. Weightbearing as tolerated on the left.      Return to clinic in 2 weeks.  New femur xrays then.     CONSTANZA Rosa MD  Dept. Orthopedic Surgery  [facility]    This document serves as  a record of the services and decisions personally performed and made by CONSTANZA Rosa MD. It was created on his behalf by Tierney Garrett, a trained medical scribe. The creation of this document is based the provider's statements to the medical scribe.    Marilynibe Tierney Garrett 10:23 AM 8/1/2017        Again, thank you for allowing me to participate in the care of your patient.        Sincerely,        Carlos Rosa MD

## 2017-08-01 NOTE — PROGRESS NOTES
SUBJECTIVE:  Navid Estrella is a 87 year old year old female who is here today for follow up of ORIF of her periprosthetic left distal femur fracture on 5/24/17. She reports that her pain has decreased substantially from 2 weeks ago. Still in the rehab center.  Presents with her sister today.     It has been approximately 10 weeks since the procedure.  She is back early.    She has been compliant with weight-bearing restrictions.   No fevers or chills.     OBJECTIVE:   /88 (BP Location: Right arm, Patient Position: Chair, Cuff Size: Adult Regular)  Pulse 102  Resp 18  SpO2 90%   Patient appears to be alert and in no apparent distress distress  Inspection: surgical wounds healed, calf ulcer has healed, upper inner ecchymosis  Tender: inner thigh  Log rolling the femur does not cause any pain  Rotation of the hip is pain free   Knee ROM: near full extension to 100 degrees   Ligaments: Stable   Able to perform SLR without difficulty.    ASSESSMENT:   1. Healing periprosthetic left distal femur fracture  2. Superificial ulcer of the left calf--healed.     PLAN: I think patient is doing well, particularly compared to 2 weeks ago. For the next 2 weeks, she should continue to wear the knee immobilizer with ambulation only. Take off immobilizer when at rest. Once patient passes physical therapy, she can leave nursing home. Discharge to sister's home when patient passes her physical therapy. Hope for discharge in 2 weeks. Will need assistance with putting on and taking off the immobilizer. Weightbearing as tolerated on the left.      Return to clinic in 2 weeks.  New femur xrays then.     CONSTANZA Rosa MD  Dept. Orthopedic Surgery  [facility]    This document serves as a record of the services and decisions personally performed and made by CONSTANZA Rosa MD. It was created on his behalf by Tierney Garrett, a trained medical scribe. The creation of this document is based the provider's statements to  the medical scribe.    Scribe Tierney Garrett 10:23 AM 8/1/2017

## 2017-08-01 NOTE — MR AVS SNAPSHOT
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, subsequent encounter    -  1       Follow-ups after your visit        Your next 10 appointments already scheduled     Aug 15, 2017 10:00 AM CDT   Return Visit with Carlos Rosa MD   [facility] ([facility])    [address] [facility] [address] [phone]            Aug 16, 2017  2:30 PM CDT   Return Visit with DEVICE CHECK RN CARD   [facility] AT [facility])    [address] [place] [address] 2nd Floor  [facility] [id number] [phone]              Who to contact     If you have questions or need follow up information about today's clinic visit or your schedule please contact [facility] directly at [phone].  Normal or non-critical lab and imaging results will be communicated to you by MyChart, letter or phone within 4 business days after the clinic has received the results. If you do not hear from us within 7 days, please contact the clinic through THE EMPTY JOINThart or phone. If you have a critical or abnormal lab result, we will notify you by phone as soon as possible.  Submit refill requests through Drimki or call your pharmacy and they will forward the refill request to us. Please allow 3 business days for your refill to be completed.          Additional Information About Your Visit        MyChart Information     Drimki gives you secure access to your electronic health record. If you see a primary care provider, you can also send messages to your care team and make appointments. If you have questions, please call your primary care clinic.  If  you do not have a primary care provider, please call [phone] and they will assist you.        Care EveryWhere ID     This is your Care EveryWhere ID. This could be used by other organizations to access your [place] medical records  [id number]        Your Vitals Were     Pulse Respirations Pulse Oximetry             102 18 90%          Blood Pressure from Last 3 Encounters:   08/01/17 149/88   07/20/17 122/64   07/18/17 120/60    Weight from Last 3 Encounters:   07/20/17 96 lb (43.5 kg)   07/18/17 97 lb 9.6 oz (44.3 kg)   05/12/17 102 lb 3.2 oz (46.4 kg)              Today, you had the following     No orders found for display       Primary Care Provider Office Phone # Fax #    Bess Leiva [phone]       [address] [address]        Equal Access to Services     SAKINA BURROWS : Hadii aad ku hadasho Soomaali, waaxda luqadaha, qaybta kaalmada adeegyada, waxay idiin hayaan danielleeg josiearaiza tavarez . So [facility] [phone].    ATENCIÓN: Si habla español, tiene a cruz disposición servicios gratuitos de asistencia lingüística. Llame al [phone].    We comply with applicable federal civil rights laws and [place] laws. We do not discriminate on the basis of race, color, national origin, age, disability sex, sexual orientation or gender identity.            Thank you!     Thank you for choosing [facility]  for your care. Our goal is always to provide you with excellent care. Hearing back from our patients is one way we can continue to improve our services. Please take a few minutes to complete the written survey that you may receive in the mail after your visit with us. Thank you!             Your Updated Medication List - Protect others around you: Learn how to safely use, store and throw away your medicines at www.disposemymeds.org.          This list is accurate as of: 8/1/17 10:44 AM.  Always use your most recent med list.                    Brand Name Dispense Instructions for use Diagnosis    ACETAMINOPHEN PO      Take 1,000 mg by mouth every 4 hours as needed for pain        alendronate 70 MG tablet    FOSAMAX    12 tablet    Take 1 tablet (70 mg) by mouth every 7 days Take with over 8 ounces water and stay upright for at least 30 minutes after dose.  Take at least 60 minutes before breakfast    Osteoporosis       amiodarone 200 MG tablet    PACERONE/CODARONE    90 tablet    Take 1 tablet (200 mg) by mouth daily    Paroxysmal atrial fibrillation (H), Sick sinus syndrome (H)       apixaban ANTICOAGULANT 2.5 MG tablet    ELIQUIS    180 tablet    Take 1 tablet (2.5 mg) by mouth 2 times daily    Paroxysmal atrial fibrillation (H)       atenolol 25 MG tablet    TENORMIN    180 tablet    Take 1 tablet (25 mg) by mouth 2 times daily    Benign essential hypertension       CULTURELLE PO      Take by mouth 3 times daily        ferrous sulfate 325 (65 FE) MG tablet    IRON     Take 325 mg by mouth daily (with breakfast)        KEFLEX PO      Take 500 mg by mouth 3 times daily        levETIRAcetam 500 MG tablet    KEPPRA    180 tablet    Take 1 tablet (500 mg) by mouth 2 times daily    Cerebrovascular accident (CVA), unspecified mechanism (H)       lisinopril 10 MG tablet    PRINIVIL/ZESTRIL    90 tablet    Take 1 tablet (10 mg) by mouth daily    Benign essential hypertension       magnesium hydroxide 400 MG/5ML suspension    MILK OF MAGNESIA    360 mL    Take 30-60 mLs by mouth daily as needed for constipation or heartburn    Other constipation       OXYCODONE HCL PO      Take 5 mg by mouth every 4 hours as needed        polyethylene glycol powder    MIRALAX    510 g    Take 17 g by mouth daily    Constipation       sennosides 8.6 MG tablet    SENOKOT     Take 1 tablet by mouth daily        simvastatin 20 MG tablet    ZOCOR    90 tablet    Take 1 tablet (20 mg) by mouth At Bedtime    Hyperlipidemia LDL goal <130       VISTARIL PO      Take 25 mg  by mouth every 4 hours as needed for itching        vitamin D 1000 UNITS capsule      Reported on 4/11/2017        ZOFRAN PO      Take 4 mg by mouth every 6 hours as needed for nausea or vomiting

## 2017-08-01 NOTE — NURSING NOTE
"Chief Complaint   Patient presents with     RECHECK     Left femur follow up. DOS: 5/24/2017       Initial /88 (BP Location: Right arm, Patient Position: Chair, Cuff Size: Adult Regular)  Pulse 102  Resp 18  SpO2 90% Estimated body mass index is 15.98 kg/(m^2) as calculated from the following:    Height as of 7/20/17: 1.651 m (5' 5\").    Weight as of 7/20/17: 43.5 kg (96 lb).  Medication Reconciliation: complete   Marjorie Galindo LPN      "

## 2017-08-11 NOTE — PROGRESS NOTES
"SUBJECTIVE:  Navid Estrella is a 87 year old year old female who is here today for follow up ORIF of her periprosthetic left distal femur fracture on 5/24/17.     It has been approximately 2.5 months since the procedure.   Patient complains of some groin pain even at rest. Tylenol seems to help with this pain.   No fevers or chills.   PT: yes    Patient presents to the clinic in a wheel chair.  She has been working on weight bearing as tolerated in physical therapy at assisted living.    OBJECTIVE:   /67 (BP Location: Right arm, Patient Position: Chair, Cuff Size: Adult Small)  Pulse 75  Resp 14  Ht 1.651 m (5' 5\")  Wt 43.1 kg (95 lb)  BMI 15.81 kg/m2   Patient appears to be alert and in no apparent distress distress  Skin intact. Neurovascularly Intact.    ROM: 0-95 degrees   Good stability of the knee  No pain with hip rotation, pain in the groin with hip flexion    X-rays: Obtained today of the LEFT FEMUR: 2-views, reviewed in the office with the patient by myself today and show the fracture maintaining position, the main fracture line is still visible but it has not displaced at all. There is very minimal arthritis of the hip, no fractures, no avascular necrosis seen.     ASSESSMENT:   1. Doing fairly well status post ORIF of her periprosthetic left distal femur fracture   2. Groin pain of uncertain cause     PLAN:   We discussed the findings and diagnosis with the patient. In regards to the hip, we talked about possible MRI scan. All questions were answered. Since tylenol is helping the pain, I wouldn't do any further work up. If tylenol is not helping with the pain, consider MRI scan of left hip.    Rehab: Continue physical therapy and home exercises.   She should continue to use the knee immobilizer with ambulation until she is able to do SLR independently.     Return to clinic in 4-6 week(s). New xrays of the let femur at that time.    CONSTANZA Rosa MD  Dept. Orthopedic Surgery  [facility] " Services    This document serves as a record of the services and decisions personally performed and made by Dr. CONSTANZA Rosa MD. It was created on his behalf by Fred aFith, a trained medical scribe. The creation of this record is based on the provider's personal observations and the statements of the patient. This document has been checked and approved by the attending provider.   Fred Faith August 15, 2017 11:06 AM

## 2017-08-15 NOTE — LETTER
"    8/15/2017         RE: Navid Estrella  [address] [place] [address]        Dear Colleague,    Thank you for referring your patient, Navid Estrella, to the [facility]. Please see a copy of my visit note below.    SUBJECTIVE:  Navid Estrella is a 87 year old year old female who is here today for follow up ORIF of her periprosthetic left distal femur fracture on 5/24/17.     It has been approximately 2.5 months since the procedure.   Patient complains of some groin pain even at rest. Tylenol seems to help with this pain.   No fevers or chills.   PT: yes    Patient presents to the clinic in a wheel chair.  She has been working on weight bearing as tolerated in physical therapy at assisted living.    OBJECTIVE:   /67 (BP Location: Right arm, Patient Position: Chair, Cuff Size: Adult Small)  Pulse 75  Resp 14  Ht 1.651 m (5' 5\")  Wt 43.1 kg (95 lb)  BMI 15.81 kg/m2   Patient appears to be alert and in no apparent distress distress  Skin intact. Neurovascularly Intact.    ROM: 0-95 degrees   Good stability of the knee  No pain with hip rotation, pain in the groin with hip flexion    X-rays: Obtained today of the LEFT FEMUR: 2-views, reviewed in the office with the patient by myself today and show the fracture maintaining position, the main fracture line is still visible but it has not displaced at all. There is very minimal arthritis of the hip, no fractures, no avascular necrosis seen.     ASSESSMENT:   1. Doing fairly well status post ORIF of her periprosthetic left distal femur fracture   2. Groin pain of uncertain cause     PLAN:   We discussed the findings and diagnosis with the patient. In regards to the hip, we talked about possible MRI scan. All questions were answered. Since tylenol is helping the pain, I wouldn't do any further work up. If tylenol is not helping with the pain, consider MRI scan of left hip.    Rehab: Continue physical therapy and home exercises.   She should " continue to use the knee immobilizer with ambulation until she is able to do SLR independently.     Return to clinic in 4-6 week(s). New xrays of the let femur at that time.    CONSTANZA Rosa MD  Dept. Orthopedic Surgery  [facility]    This document serves as a record of the services and decisions personally performed and made by Dr. CONSTANZA Rosa MD. It was created on his behalf by Fred Faith, a trained medical scribe. The creation of this record is based on the provider's personal observations and the statements of the patient. This document has been checked and approved by the attending provider.   Fred Faith August 15, 2017 11:06 AM    Again, thank you for allowing me to participate in the care of your patient.        Sincerely,        Carlos Rosa MD

## 2017-08-15 NOTE — MR AVS SNAPSHOT
After Visit Summary   8/15/2017    Navid Estrella    MRN: [id number]           Patient Information     Date Of Birth          5/14/1930        Visit Information        Provider Department      8/15/2017 10:00 AM Carlos Rosa MD [facility]        Today's Diagnoses     Periprosthetic fracture of shaft of femur    -  1      Care Instructions    Please remember to call and schedule a follow up appointment if one was recommended at your earliest convenience.   Orthopedics CLINIC HOURS TELEPHONE NUMBER   Carlos Rosa M.D.      Titus Greene  Medical Assistant Tuesday 8 am -5 pm    Wednesday 8 am -1 pm    Thursday 8 am -5 pm   Specialty schedulers:   [phone] to make an appointment with any Specialty Provider.   Main Clinic:   [phone] to make an appointment with your primary provider   Urgent Care locations:    [facility] Monday-Friday 5 pm - 9 pm  Saturday-Sunday 9 am -5pm      Monday-Friday 11 am - 9 pm  Saturday 9 am - 5 pm [phone]     If SURGERY has been recommended, please call our Specialty Schedulers at [phone] to schedule your procedure.    If you need a medication refill, please contact your pharmacy. Please allow 3 business days for your refill to be completed.  Use [employer] (secure e-mail communication and access to your chart) to send a message or to make an appointment. Please ask how you can sign up for [employer].          Follow-ups after your visit        Your next 10 appointments already scheduled     Aug 16, 2017  2:30 PM CDT   Return Visit with DEVICE CHECK RN CARD   [facility] AT [facility] ([facility])    [address]  [facility] [address] [phone]            Oct 03, 2017 10:00 AM CDT   Return Visit with Carlos Rosa MD   [facility]    [address] [address]  "  [phone]              Who to contact     If you have questions or need follow up information about today's clinic visit or your schedule please contact [facility] directly at [phone].  Normal or non-critical lab and imaging results will be communicated to you by MyChart, letter or phone within 4 business days after the clinic has received the results. If you do not hear from us within 7 days, please contact the clinic through MyChart or phone. If you have a critical or abnormal lab result, we will notify you by phone as soon as possible.  Submit refill requests through Osito or call your pharmacy and they will forward the refill request to us. Please allow 3 business days for your refill to be completed.          Additional Information About Your Visit        [employer] Information     Os[employer] gives you secure access to your electronic health record. If you see a primary care provider, you can also send messages to your care team and make appointments. If you have questions, please call your primary care clinic.  If you do not have a primary care provider, please call [phone] and they will assist you.        Care EveryWhere ID     This is your Care EveryWhere ID. This could be used by other organizations to access your [place] medical records  [id number]        Your Vitals Were     Pulse Respirations Height BMI (Body Mass Index)          75 14 1.651 m (5' 5\") 15.81 kg/m2         Blood Pressure from Last 3 Encounters:   08/15/17 104/67   08/01/17 149/88   07/20/17 122/64    Weight from Last 3 Encounters:   08/15/17 43.1 kg (95 lb)   07/20/17 43.5 kg (96 lb)   07/18/17 44.3 kg (97 lb 9.6 oz)               Primary Care Provider Office Phone # Fax #    Bess Leiva [phone]       [address] [address]        Equal Access to Services     SAKINA BURROWS AH: Diaz Daniel, sue luzainabadaallen, qaybta " les bazan hayamber santosrichard tavarez ah. [facility] [phone].    ATENCIÓN: Si pedrito resendez, tiene a cruz disposición servicios gratuitos de asistencia lingüística. Jj al [phone].    We comply with applicable federal civil rights laws and [place] laws. We do not discriminate on the basis of race, color, national origin, age, disability sex, sexual orientation or gender identity.            Thank you!     Thank you for choosing [facility]  for your care. Our goal is always to provide you with excellent care. Hearing back from our patients is one way we can continue to improve our services. Please take a few minutes to complete the written survey that you may receive in the mail after your visit with us. Thank you!             Your Updated Medication List - Protect others around you: Learn how to safely use, store and throw away your medicines at www.disposemymeds.org.          This list is accurate as of: 8/15/17 11:59 PM.  Always use your most recent med list.                   Brand Name Dispense Instructions for use Diagnosis    ACETAMINOPHEN PO      Take 1,000 mg by mouth every 4 hours as needed for pain        alendronate 70 MG tablet    FOSAMAX    12 tablet    Take 1 tablet (70 mg) by mouth every 7 days Take with over 8 ounces water and stay upright for at least 30 minutes after dose.  Take at least 60 minutes before breakfast    Osteoporosis       amiodarone 200 MG tablet    PACERONE/CODARONE    90 tablet    Take 1 tablet (200 mg) by mouth daily    Paroxysmal atrial fibrillation (H), Sick sinus syndrome (H)       apixaban ANTICOAGULANT 2.5 MG tablet    ELIQUIS    180 tablet    Take 1 tablet (2.5 mg) by mouth 2 times daily    Paroxysmal atrial fibrillation (H)       atenolol 25 MG tablet    TENORMIN    180 tablet    Take 1 tablet (25 mg) by mouth 2 times daily    Benign essential hypertension       CULTURELLE PO      Take by mouth 3 times daily        ferrous sulfate  325 (65 FE) MG tablet    IRON     Take 325 mg by mouth daily (with breakfast)        KEFLEX PO      Take 500 mg by mouth 3 times daily        levETIRAcetam 500 MG tablet    KEPPRA    180 tablet    Take 1 tablet (500 mg) by mouth 2 times daily    Cerebrovascular accident (CVA), unspecified mechanism (H)       lisinopril 10 MG tablet    PRINIVIL/ZESTRIL    90 tablet    Take 1 tablet (10 mg) by mouth daily    Benign essential hypertension       magnesium hydroxide 400 MG/5ML suspension    MILK OF MAGNESIA    360 mL    Take 30-60 mLs by mouth daily as needed for constipation or heartburn    Other constipation       OXYCODONE HCL PO      Take 5 mg by mouth every 4 hours as needed        polyethylene glycol powder    MIRALAX    510 g    Take 17 g by mouth daily    Constipation       sennosides 8.6 MG tablet    SENOKOT     Take 1 tablet by mouth daily        simvastatin 20 MG tablet    ZOCOR    90 tablet    Take 1 tablet (20 mg) by mouth At Bedtime    Hyperlipidemia LDL goal <130       VISTARIL PO      Take 25 mg by mouth every 4 hours as needed for itching        vitamin D 1000 UNITS capsule      Reported on 4/11/2017        ZOFRAN PO      Take 4 mg by mouth every 6 hours as needed for nausea or vomiting

## 2017-08-15 NOTE — PATIENT INSTRUCTIONS
Please remember to call and schedule a follow up appointment if one was recommended at your earliest convenience.   Orthopedics CLINIC HOURS TELEPHONE NUMBER   Carlos Rosa M.D.      Titus Greene  Medical Assistant Tuesday 8 am -5 pm    Wednesday 8 am -1 pm    Thursday 8 am -5 pm   Specialty schedulers:   [phone] to make an appointment with any Specialty Provider.   Main Clinic:   [phone] to make an appointment with your primary provider   Urgent Care locations:    [facility] Monday-Friday 5 pm - 9 pm  Saturday-Sunday 9 am -5pm      Monday-Friday 11 am - 9 pm  Saturday 9 am - 5 pm [phone]     If SURGERY has been recommended, please call our Specialty Schedulers at [phone] to schedule your procedure.    If you need a medication refill, please contact your pharmacy. Please allow 3 business days for your refill to be completed.  Use [employer] (secure e-mail communication and access to your chart) to send a message or to make an appointment. Please ask how you can sign up for [employer].

## 2017-08-15 NOTE — NURSING NOTE
"Chief Complaint   Patient presents with     RECHECK      follow up of ORIF of her periprosthetic left distal femur fracture on 5/24/17.        Initial /67 (BP Location: Right arm, Patient Position: Chair, Cuff Size: Adult Small)  Pulse 75  Resp 14  Ht 1.651 m (5' 5\")  Wt 43.1 kg (95 lb)  BMI 15.81 kg/m2 Estimated body mass index is 15.81 kg/(m^2) as calculated from the following:    Height as of this encounter: 1.651 m (5' 5\").    Weight as of this encounter: 43.1 kg (95 lb).  Medication Reconciliation: complete   Titus Greene MA      "

## 2017-08-18 NOTE — TELEPHONE ENCOUNTER
Reason for Call:  Other call back    Detailed comments: Jj from [facility] is calling to get confirmation that Dr. Leiva will follow up with patient care via phone and fax.Please call and advise thank you    Phone Number Patient can be reached at: [phone]    Best Time: any    Can we leave a detailed message on this number? YES    Call taken on 8/18/2017 at 8:13 AM by Kathya Jasso

## 2017-08-18 NOTE — TELEPHONE ENCOUNTER
RN: call Moni, yes I will follow patient . OK for home health aid, nursing, physical therapy and OT.    TATE WILKS M.D.

## 2017-08-18 NOTE — TELEPHONE ENCOUNTER
Spoke with Mehreen and gave okay for home care orders, she verbalized understanding.  Ladonna Gallegos RN

## 2017-08-18 NOTE — TELEPHONE ENCOUNTER
Patient is discharging from assisted living facility and going home.  Moni FARRIS with home care would like to know if PCP will follow patient via phone and fax for home care.  Patient will be needing home health aid, nursing, physical and OT.  Please advise   Ladonna Gallegos RN

## 2017-08-21 NOTE — TELEPHONE ENCOUNTER
Reason for call:  Other   Patient called regarding (reason for call): call back  Additional comments: Please call sister Meg, friend of the family (nurse) came by to check medications and BP; she said there was a problem. They want to know correct dosage and what she should be taking. Blood Pressure is fluctuating.    Phone number to reach patient:  Other phone number:  [phone] Meg    Best Time:  ASAP    Can we leave a detailed message on this number?  YES

## 2017-08-21 NOTE — TELEPHONE ENCOUNTER
Patient states her blood pressure yesterday was 76/42, so her friend (who is a nurse) had her hold her atenolol today. Today her blood pressure is 134/72. Yesterday patient complained of feeling confused, shaky and lightheaded. She denies those symptoms today. She would like to know if she should continue to hold her atenolol.    More Boucher RN - BC

## 2017-08-22 NOTE — TELEPHONE ENCOUNTER
Call her. No she should not hold her atenolol. She needs to drink more water and eat more salty crackers when her blood pressure is low, but she should not hold her blood pressure pills given that she recently had a stroke.   Thanks for calling me.     TATE WILKS M.D.

## 2017-08-22 NOTE — TELEPHONE ENCOUNTER
Patient notified of providers message as written.  Patient verbalized understanding, no further questions or concerns.    Ladonna Gallegos RN

## 2017-08-30 NOTE — TELEPHONE ENCOUNTER
Reason for Call:  Other call back    Detailed comments:  [facility] calling. Are you going to be doing her surgery and following this patient at [facility]? Please call and advise.     Phone Number Patient can be reached at: Other phone number:   Number above.     Best Time:  Any     Can we leave a detailed message on this number? YES    Call taken on 8/30/2017 at 8:51 AM by Marjorie Goetz

## 2017-09-07 NOTE — TELEPHONE ENCOUNTER
Paperwork received from [employer].  Requesting an addendum to past OV note.  Patient was hospitalized on 8/28/17 with discharge date of 9/1/17.  Provider has not seen patient since then and would like patient to come in for visit.   Left detailed message for Hannah at [phone] with message above, advised her to call back with any questions.  Ladonna Gallegos RN

## 2017-09-11 NOTE — TELEPHONE ENCOUNTER
Another fax received from [facility]. With a different sending name.  Left voice mail message for Mouna that patient will need OV with provider for orders.   Ladonna Gallegos RN

## 2017-09-12 NOTE — NURSING NOTE
"Chief Complaint   Patient presents with     RECHECK     Left distal femur fracture pt states is not having any pain       Initial /81  Pulse 90  Ht 1.651 m (5' 5\")  Wt 40 kg (88 lb 3.2 oz)  SpO2 90%  BMI 14.68 kg/m2 Estimated body mass index is 14.68 kg/(m^2) as calculated from the following:    Height as of this encounter: 1.651 m (5' 5\").    Weight as of this encounter: 40 kg (88 lb 3.2 oz).  Medication Reconciliation: shaina Olivares CMA 9/12/2017 10:48 AM      "

## 2017-09-12 NOTE — MR AVS SNAPSHOT
After Visit Summary   9/12/2017    Navid Estrella    MRN: [id number]           Patient Information     Date Of Birth          5/14/1930        Visit Information        Provider Department      9/12/2017 10:30 AM Carlos Rosa MD [facility]        Today's Diagnoses     Periprosthetic fracture of shaft of femur    -  1    Weakness of left lower extremity        Thigh hematoma, left, initial encounter           Follow-ups after your visit        Your next 10 appointments already scheduled     Sep 22, 2017 12:00 PM CDT   Return Visit with Bruno Haines MD   [facility] AT [facility] ([facility])    [address] [facility] [address]  [facility] [address]   [phone]            Oct 03, 2017 10:00 AM CDT   [facility] Visit with Carlos Rosa MD   [facility]    [address] [place] [address]   [phone]              Who to contact     If you have questions or need follow up information about today's clinic visit or your schedule please contact [facility] directly at [phone].  Normal or non-critical lab and imaging results will be communicated to you by MyChart, letter or phone within 4 business days after the clinic has received the results. If you do not hear from us within 7 days, please contact the clinic through MyChart or phone. If you have a critical or abnormal lab result, we will notify you by phone as soon as possible.  Submit refill requests through [employer]$ [employer]Money or call your pharmacy and they will forward the refill request to us. Please allow 3 business days for your refill to be completed.          Additional Information About Your Visit        MyChart Information     [employer] gives you secure access to your electronic health record. If you see a primary care provider, you can also send messages to your care team and make appointments. If you have questions,  "please call your primary care clinic.  If you do not have a primary care provider, please call [phone] and they will assist you.        Care EveryWhere ID     This is your Care EveryWhere ID. This could be used by other organizations to access your [place] medical records  [id number]        Your Vitals Were     Pulse Height Pulse Oximetry BMI (Body Mass Index)          90 1.651 m (5' 5\") 90% 14.68 kg/m2         Blood Pressure from Last 3 Encounters:   09/12/17 131/81   08/15/17 104/67   08/01/17 149/88    Weight from Last 3 Encounters:   09/12/17 40 kg (88 lb 3.2 oz)   08/15/17 43.1 kg (95 lb)   07/20/17 43.5 kg (96 lb)               Primary Care Provider Office Phone # Fax #    Bess Leiva [phone]       [address] [address]        Equal Access to Services     SAKINA BURROWS : Hadii kobi ku hadasho Soomaali, waaxda luqadaha, qaybta kaalmada adeegyada, les tavarez . So [facility] [phone].    ATENCIÓN: Si habla español, tiene a cruz disposición servicios gratuitos de asistencia lingüística. Llame al [phone].    We comply with applicable federal civil rights laws and [place] laws. We do not discriminate on the basis of race, color, national origin, age, disability sex, sexual orientation or gender identity.            Thank you!     Thank you for choosing [facility]  for your care. Our goal is always to provide you with excellent care. Hearing back from our patients is one way we can continue to improve our services. Please take a few minutes to complete the written survey that you may receive in the mail after your visit with us. Thank you!             Your Updated Medication List - Protect others around you: Learn how to safely use, store and throw away your medicines at www.disposemymeds.org.          This list is accurate as of: 9/12/17 12:49 PM.  Always use your most recent med list. "                   Brand Name Dispense Instructions for use Diagnosis    ACETAMINOPHEN PO      Take 1,000 mg by mouth every 4 hours as needed for pain        alendronate 70 MG tablet    FOSAMAX    12 tablet    Take 1 tablet (70 mg) by mouth every 7 days Take with over 8 ounces water and stay upright for at least 30 minutes after dose.  Take at least 60 minutes before breakfast    Osteoporosis       amiodarone 200 MG tablet    PACERONE/CODARONE    90 tablet    Take 1 tablet (200 mg) by mouth daily    Paroxysmal atrial fibrillation (H), Sick sinus syndrome (H)       apixaban ANTICOAGULANT 2.5 MG tablet    ELIQUIS    180 tablet    Take 1 tablet (2.5 mg) by mouth 2 times daily    Paroxysmal atrial fibrillation (H)       aspirin 81 MG chewable tablet      Take 81 mg by mouth daily        dronabinol 2.5 MG capsule    MARINOL     Take 2.5 mg by mouth        ferrous sulfate 325 (65 FE) MG tablet    IRON     Take 325 mg by mouth daily (with breakfast)        levETIRAcetam 500 MG tablet    KEPPRA    180 tablet    Take 1 tablet (500 mg) by mouth 2 times daily    Cerebrovascular accident (CVA), unspecified mechanism (H)       lisinopril 10 MG tablet    PRINIVIL/ZESTRIL    90 tablet    Take 1 tablet (10 mg) by mouth daily    Benign essential hypertension       OXYCODONE HCL PO      Take 5 mg by mouth every 4 hours as needed        polyethylene glycol powder    MIRALAX    510 g    Take 17 g by mouth daily    Constipation       sennosides 8.6 MG tablet    SENOKOT     Take 1 tablet by mouth daily        simvastatin 20 MG tablet    ZOCOR    90 tablet    Take 1 tablet (20 mg) by mouth At Bedtime    Hyperlipidemia LDL goal <130       traMADol 50 MG tablet    ULTRAM     Take 50 mg by mouth        vitamin D 1000 UNITS capsule      Reported on 4/11/2017        ZOFRAN PO      Take 4 mg by mouth every 6 hours as needed for nausea or vomiting

## 2017-09-12 NOTE — LETTER
"    9/12/2017         RE: Navid Estrella  [address] [place] [id number]        Dear Colleague,    Thank you for referring your patient, Navid Estrella, to the [facility]. Please see a copy of my visit note below.    SUBJECTIVE:  Navid Estrella is a 87 year old year old female who is here today for follow up ORIF of her periprosthetic left distal femur fracture on 5/24/17. Last visit she was having some groin pain of uncertain origin. She was in the hospital last week with a spontaneous hematoma in her anterior thigh and this was diagnosed by an MRI.  Her thigh pain is decreasing.      It has been approximately 3.5 months since the procedure.   Patient feels she may need a brace for her leg.   No fevers or chills.   PT: yes    Patient presents to the clinic in a wheelchair.     OBJECTIVE:   /81  Pulse 90  Ht 1.651 m (5' 5\")  Wt 40 kg (88 lb 3.2 oz)  SpO2 90%  BMI 14.68 kg/m2   Patient appears to be alert and in no apparent distress distress  Skin intact. Neurovascularly Intact.    Tightness of the proximal anterior thigh. It is less tight than it was before.   Pretty good hip rotation and complained of some pain  Able to hold the knee straight in the chair, but can't do a full SLR.     X-rays: Obtained today of the LEFT FEMUR: 2-views, reviewed in the office with the patient by myself today, compared to 8/15/17, and show the fracture is undergone some healing. No evidence of osteonecrosis of the femoral head. Good hip joint spacing.     ASSESSMENT:   1. Healing periprosthetic left distal femur fracture   2. Thigh hematoma  3. Lower extremity weakness    PLAN:   We discussed the findings and diagnosis with the patient and patient's daughter.   Brace: Continue to use knee immobilizer with ambulation until able to perform independent SLR. The brace should be off when not ambulating.    I don't think the hematoma requires draining, but it could be reevaluated with an ultrasound and the " watery phase that will develop, could be drained with a needle under image guidance  Pain Meds: Tylenol as needed at recommended dosage for mild pain. Ultram for moderate to severe pain.   Rehab: Continue physical therapy and home exercises. PT needs to work on lower extremity strengthening and modalities, such as ultrasound to the area of hematoma.     Return to clinic PRN. She will be moving to [place] with her daughter in a week.     She should have new femur xrays in about 4-6 weeks.    CONSTANZA Rosa MD  Dept. Orthopedic Surgery  [facility]    This document serves as a record of the services and decisions personally performed and made by Dr. CONSTANZA Rosa MD. It was created on his behalf by Fred Faith, a trained medical scribe. The creation of this record is based on the provider's personal observations and the statements of the patient. This document has been checked and approved by the attending provider.   Fred Faith September 12, 2017 11:17 AM     Again, thank you for allowing me to participate in the care of your patient.        Sincerely,        Carlos Rosa MD

## 2017-09-13 NOTE — TELEPHONE ENCOUNTER
Reason for Call:  Other call back    Detailed comments:  Daughter calling. Navid missed three flights that they are trying to get reimbursed for. They did not get the letter from 5-23-17. Please call to discuss further.     Phone Number Patient can be reached at:  Call the cell number above.     Best Time:  Any     Can we leave a detailed message on this number? YES    Call taken on 9/13/2017 at 2:48 PM by Marjorie Goetz

## 2017-09-14 NOTE — TELEPHONE ENCOUNTER
Called daughter Kathya she has paperwork from [employer] of what all is needed from provider in a letter to [employer] to get reimbursed for the flights. She will bring that in later today for provider.  Marley Roberts,

## 2017-09-14 NOTE — TELEPHONE ENCOUNTER
Reason for Call:  Other/ Letter    Detailed comments: Pt is needing a letter for the airlines for missing flights. This is needisng to be Emailed to [email or web address] and Kathya will come to  original # [phone]    Phone Number Patient can be reached at: Other phone number:  micelle [phone]    Best Time: any time    Can we leave a detailed message on this number? YES    Call taken on 9/14/2017 at 12:42 PM by Ariana Crystal

## 2017-09-25 NOTE — TELEPHONE ENCOUNTER
This patient was discharged from [facility] on 9/20/2017.    Discharge Diagnosis: Pseudoaneurysm of left profounda femoral artery with enlarging hematoma     A follow-up visit has not been scheduled.   Next 5 appointments (look out 90 days)     Oct 03, 2017 10:00 AM CDT   Return Visit with Carlos Rosa MD   [facility] ([facility])    [address] [facility] [id number] [phone]              Number of ED/ER visits in the last 12 months:       Please follow-up with patient.    Sona Eagle,

## 2017-09-25 NOTE — TELEPHONE ENCOUNTER
Spoke with patients daughter, patient is currently in rehab at [facility].  Patient will f/u after discharge.   Ladonna Gallegos RN

## 2017-10-04 ENCOUNTER — TELEPHONE (OUTPATIENT)
Dept: FAMILY MEDICINE | Facility: CLINIC | Age: 82
End: 2017-10-04

## 2017-10-04 ENCOUNTER — NURSE TRIAGE (OUTPATIENT)
Dept: NURSING | Facility: CLINIC | Age: 82
End: 2017-10-04

## 2017-10-05 NOTE — TELEPHONE ENCOUNTER
I called patient's daughter. Patient fell in daughter's house in [place]  and fractured ribs and scapula. She was home on hospice and  10/1/17.     TATE WILKS M.D.

## 2017-10-05 NOTE — TELEPHONE ENCOUNTER
See telephone encounter sent to PCP care team.    Alona Lafleur, RN  [facility]

## 2017-10-05 NOTE — TELEPHONE ENCOUNTER
Clinic Action Needed:No/FYI only  Reason for Call: Mary Beth with [facility] in [place] placed a curtesy call to let Dr. Leiva know that Navid was admitted to Hospice yesterday in [place].  Mary Beth left her cell number of [phone] and the Hospice general line of [phone] if care team wishes to speak to anyone at hospice.  She also advised that Navid will be a Medical Examiner Case. Thank you.     Routed to:  Team Alejandra Lafleur RN  [facility]

## 2017-10-17 ENCOUNTER — MEDICAL CORRESPONDENCE (OUTPATIENT)
Dept: HEALTH INFORMATION MANAGEMENT | Facility: CLINIC | Age: 82
End: 2017-10-17

## 2017-10-18 ENCOUNTER — MEDICAL CORRESPONDENCE (OUTPATIENT)
Dept: HEALTH INFORMATION MANAGEMENT | Facility: CLINIC | Age: 82
End: 2017-10-18